# Patient Record
Sex: FEMALE | Race: WHITE | NOT HISPANIC OR LATINO | URBAN - METROPOLITAN AREA
[De-identification: names, ages, dates, MRNs, and addresses within clinical notes are randomized per-mention and may not be internally consistent; named-entity substitution may affect disease eponyms.]

---

## 2021-09-29 ENCOUNTER — OFFICE VISIT (OUTPATIENT)
Dept: MEDICAL GROUP | Facility: MEDICAL CENTER | Age: 26
End: 2021-09-29
Payer: COMMERCIAL

## 2021-09-29 ENCOUNTER — TELEPHONE (OUTPATIENT)
Dept: SCHEDULING | Facility: IMAGING CENTER | Age: 26
End: 2021-09-29

## 2021-09-29 VITALS
WEIGHT: 171.74 LBS | HEART RATE: 90 BPM | DIASTOLIC BLOOD PRESSURE: 68 MMHG | BODY MASS INDEX: 29.32 KG/M2 | TEMPERATURE: 98 F | OXYGEN SATURATION: 97 % | SYSTOLIC BLOOD PRESSURE: 122 MMHG | HEIGHT: 64 IN

## 2021-09-29 DIAGNOSIS — M79.675 PAIN OF TOE OF LEFT FOOT: ICD-10-CM

## 2021-09-29 DIAGNOSIS — G43.009 MIGRAINE WITHOUT AURA AND WITHOUT STATUS MIGRAINOSUS, NOT INTRACTABLE: ICD-10-CM

## 2021-09-29 DIAGNOSIS — Z13.220 ENCOUNTER FOR LIPID SCREENING FOR CARDIOVASCULAR DISEASE: ICD-10-CM

## 2021-09-29 DIAGNOSIS — Z13.21 ENCOUNTER FOR VITAMIN DEFICIENCY SCREENING: ICD-10-CM

## 2021-09-29 DIAGNOSIS — Z13.1 SCREENING FOR DIABETES MELLITUS: ICD-10-CM

## 2021-09-29 DIAGNOSIS — Z13.0 SCREENING FOR DEFICIENCY ANEMIA: ICD-10-CM

## 2021-09-29 DIAGNOSIS — Z13.6 ENCOUNTER FOR LIPID SCREENING FOR CARDIOVASCULAR DISEASE: ICD-10-CM

## 2021-09-29 DIAGNOSIS — H93.8X3 PRESSURE SENSATION IN BOTH EARS: ICD-10-CM

## 2021-09-29 PROBLEM — S53.441A SPRAIN OF ULNAR COLLATERAL LIGAMENT OF RIGHT ELBOW: Status: ACTIVE | Noted: 2021-03-01

## 2021-09-29 PROBLEM — S52.123D CLOSED DISPLACED FRACTURE OF HEAD OF RADIUS WITH ROUTINE HEALING: Status: RESOLVED | Noted: 2021-03-01 | Resolved: 2021-09-29

## 2021-09-29 PROBLEM — S52.123D CLOSED DISPLACED FRACTURE OF HEAD OF RADIUS WITH ROUTINE HEALING: Status: ACTIVE | Noted: 2021-03-01

## 2021-09-29 PROBLEM — S53.441A SPRAIN OF ULNAR COLLATERAL LIGAMENT OF RIGHT ELBOW: Status: RESOLVED | Noted: 2021-03-01 | Resolved: 2021-09-29

## 2021-09-29 PROCEDURE — 99214 OFFICE O/P EST MOD 30 MIN: CPT | Performed by: FAMILY MEDICINE

## 2021-09-29 ASSESSMENT — ENCOUNTER SYMPTOMS
DIARRHEA: 0
WEIGHT LOSS: 0
DOUBLE VISION: 0
CONSTIPATION: 0
SHORTNESS OF BREATH: 0
ABDOMINAL PAIN: 0
PALPITATIONS: 0
DIZZINESS: 0
NERVOUS/ANXIOUS: 0
BLURRED VISION: 0
DEPRESSION: 0
VOMITING: 0
CHILLS: 0
FEVER: 0
MYALGIAS: 0
TINGLING: 0
NAUSEA: 0
SORE THROAT: 0
BRUISES/BLEEDS EASILY: 0
COUGH: 0
WEAKNESS: 0

## 2021-09-29 ASSESSMENT — PATIENT HEALTH QUESTIONNAIRE - PHQ9: CLINICAL INTERPRETATION OF PHQ2 SCORE: 0

## 2021-09-29 NOTE — ASSESSMENT & PLAN NOTE
Patient reports that she has a history of migraines without aura.  She has been prescribed sumatriptan to help with the migraines.    She says she does not like to use this medication as it can cause neck stiffness.  She primarily treats her migraines with Tylenol Motrin.

## 2021-09-29 NOTE — ASSESSMENT & PLAN NOTE
Patient reports that ever since she was little she would always have difficulty popping or releasing the pressure to her bilateral ears.  She recently moved here from [place] at the beginning of the year, reports that since that time she has been having worsening of the pressure to her bilateral ears as well as the loss of hearing.  Patient reports that other people have noticed her difficulty hearing them at times.  Over the last few months she has also noticed itchiness to ears and drainage.  Reports a yellow crusty drainage, but has not noticed this drainage on her pillow she wakes up in the morning.  Her father, who is a primary care practitioner back at home recommended that she take an antihistamine.  She has tried Benadryl with no help but did note that hydrocortisone cream applied topically helped somewhat with itching.  Hearing sensation as well as difficulty popping greatly improves when she goes back [place] to [place]  Reports that she lives here for her job and her life is in a higher elevation and would like to investigate the source of her ear discomfort.

## 2021-09-29 NOTE — PROGRESS NOTES
Tahira Chi is a pleasant 26 y.o. female here to establish care.    HPI:   Pressure sensation in both ears  Patient reports that ever since she was little she would always have difficulty popping or releasing the pressure to her bilateral ears.  She recently moved here from [place] at the beginning of the year, reports that since that time she has been having worsening of the pressure to her bilateral ears as well as the loss of hearing.  Patient reports that other people have noticed her difficulty hearing them at times.  Over the last few months she has also noticed itchiness to ears and drainage.  Reports a yellow crusty drainage, but has not noticed this drainage on her pillow she wakes up in the morning.  Her father, who is a primary care practitioner back at home recommended that she take an antihistamine.  She has tried Benadryl with no help but did note that hydrocortisone cream applied topically helped somewhat with itching.  Hearing sensation as well as difficulty popping greatly improves when she goes back home to [place].  Reports that she lives here for her job and her life is in a higher elevation and would like to investigate the source of her ear discomfort.    Migraine without aura and without status migrainosus, not intractable  Patient reports that she has a history of migraines without aura.  She has been prescribed sumatriptan to help with the migraines.    She says she does not like to use this medication as it can cause neck stiffness.  She primarily treats her migraines with Tylenol Motrin.    Pain of toe of left foot  Patient reports that she you have walks in flip-flops most of the time as when she wear shoes, even steel toed boots she will start to experience pain and redness to her left great toe.  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      Health Maintenance  Immunization:  Patient is due for her influenza vaccine.  It does show that patient is due for her varicella vaccine, reports that she is updated on all her vaccinations she received them in her father's clinic.  Pap smear: Patient is due for her Pap smear.    Current medicines (including changes today)  Current Outpatient Medications   Medication Sig Dispense Refill   • SUMATRIPTAN SUCCINATE PO Take  by mouth.       No current facility-administered medications for this visit.       Past Medical/ Surgical History  She  has a past medical history of Closed displaced fracture of head of radius with routine healing (3/1/2021), Migraine, and Sprain of ulnar collateral ligament of right elbow (3/1/2021).  She  has a past surgical history that includes other orthopedic surgery.    Social History  Social History     Tobacco Use   • Smoking status: Never Smoker   • Smokeless tobacco: Never Used   Substance Use Topics   • Alcohol use: Yes     Comment: a couple drinks on the weekends   • Drug use: Yes     Types: Marijuana, Inhaled     Comment: occasional x 2 month     Social History     Social History Narrative   • Not on file        Family History  Family History   Problem Relation Age of Onset   • No Known Problems Mother    • Hyperlipidemia Father    • No Known Problems Brother    • Diabetes Maternal Grandfather         type 1   • No Known Problems Paternal Grandmother    • Heart Disease Paternal Grandfather      Family Status   Relation Name Status   • Mo  Alive   • Fa  Alive   • Bro Celso Alive   • MGFa  (Not Specified)   • PGMo  (Not Specified)   • PGFa  (Not Specified)         Review of Systems   Constitutional: Negative for chills, fever, malaise/fatigue and weight loss.   HENT: Positive for ear discharge, ear pain and hearing loss. Negative for sore throat.    Eyes: Negative for blurred vision and double vision.   Respiratory: Negative for cough and shortness of breath.    Cardiovascular: Negative for chest pain, palpitations and  "leg swelling.   Gastrointestinal: Negative for abdominal pain, constipation, diarrhea, nausea and vomiting.   Musculoskeletal: Negative for myalgias.        Left great toe pain while wearing shoes   Skin: Negative for rash.   Neurological: Negative for dizziness, tingling and weakness.   Endo/Heme/Allergies: Does not bruise/bleed easily.   Psychiatric/Behavioral: Negative for depression. The patient is not nervous/anxious.          Objective:     /68 (BP Location: Left arm, Patient Position: Sitting, BP Cuff Size: Adult)   Pulse 90   Temp 36.7 °C (98 °F) (Temporal)   Ht 1.62 m (5' 3.78\")   Wt 77.9 kg (171 lb 11.8 oz)   SpO2 97%  Body mass index is 29.68 kg/m².    Physical Exam  Constitutional:       General: She is not in acute distress.  HENT:      Head: Normocephalic and atraumatic.      Right Ear: Tympanic membrane, ear canal and external ear normal.      Left Ear: Tympanic membrane, ear canal and external ear normal.   Eyes:      Conjunctiva/sclera: Conjunctivae normal.      Pupils: Pupils are equal, round, and reactive to light.   Cardiovascular:      Rate and Rhythm: Normal rate and regular rhythm.      Heart sounds: No murmur heard.   No friction rub. No gallop.    Pulmonary:      Effort: Pulmonary effort is normal.      Breath sounds: Normal breath sounds. No wheezing or rales.   Abdominal:      General: Bowel sounds are normal.      Palpations: Abdomen is soft.      Tenderness: There is no abdominal tenderness.   Musculoskeletal:      Cervical back: Normal range of motion and neck supple.      Right lower leg: No edema.      Left lower leg: No edema.   Skin:     General: Skin is warm and dry.      Findings: No rash.   Neurological:      Mental Status: She is alert and oriented to person, place, and time.      Gait: Gait is intact.   Psychiatric:         Mood and Affect: Affect normal.        Imaging:  No recent imaging to review    Labs:   No recent labs to review  Assessment and Plan:   The " following treatment plan was discussed    1. Migraine without aura and without status migrainosus, not intractable  Chronic, stable.  I recommended that the patient continue with over-the-counter measures as the ears helping her with her migraines.  In our next visit together we will discuss with the patient about possibly starting on a prophylactic treatment which may help prevent migraines altogether.    2. Pressure sensation in both ears  Chronic, unstable.  I recommended that the patient follow-up with ear nose and throat as they were able to take a little bit more of a look as to the source of her hearing loss and inability to depressurized her ears.  I did recommend an over-the-counter antihistamine such as Zyrtec, Allegra, or Claritin to help with itchiness.  In her physical exam I am unable to determine the source of her ear drainage, I suspect that this is most likely cerumen.  - REFERRAL TO ENT    3. Pain of toe of left foot  Chronic, unstable.  I recommended that the patient wear shoes that are wide in the foot but as patient wears ski boots for her work I recommended that she follow-up with podiatry to investigate her toe pain further.  - REFERRAL TO PODIATRY    4. Encounter for lipid screening for cardiovascular disease  - Lipid Profile; Future    5. Encounter for vitamin deficiency screening  - VITAMIN D,25 HYDROXY; Future    6. Screening for deficiency anemia  - CBC WITH DIFFERENTIAL; Future    7. Screening for diabetes mellitus  - Comp Metabolic Panel; Future  - ESTIMATED GFR; Future      Records requested.  Followup: Return in about 4 weeks (around 10/27/2021), or if symptoms worsen or fail to improve, for well women.      Please note that this dictation was created using voice recognition software. I have made every reasonable attempt to correct obvious errors, but I expect that there are errors of grammar and possibly content that I did not discover before finalizing the note.

## 2021-09-29 NOTE — ASSESSMENT & PLAN NOTE
Patient reports that she you have walks in flip-flops most of the time as when she wear shoes, even steel toed boots she will start to experience pain and redness to her left great toe.  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

## 2022-04-02 ENCOUNTER — APPOINTMENT (OUTPATIENT)
Dept: RADIOLOGY | Facility: MEDICAL CENTER | Age: 27
DRG: 872 | End: 2022-04-02
Attending: EMERGENCY MEDICINE
Payer: COMMERCIAL

## 2022-04-02 ENCOUNTER — APPOINTMENT (OUTPATIENT)
Dept: RADIOLOGY | Facility: MEDICAL CENTER | Age: 27
DRG: 872 | End: 2022-04-02
Payer: COMMERCIAL

## 2022-04-02 ENCOUNTER — HOSPITAL ENCOUNTER (OUTPATIENT)
Dept: RADIOLOGY | Facility: MEDICAL CENTER | Age: 27
End: 2022-04-02

## 2022-04-02 ENCOUNTER — HOSPITAL ENCOUNTER (INPATIENT)
Facility: MEDICAL CENTER | Age: 27
LOS: 5 days | DRG: 872 | End: 2022-04-07
Attending: EMERGENCY MEDICINE | Admitting: INTERNAL MEDICINE
Payer: COMMERCIAL

## 2022-04-02 PROBLEM — J02.9 LEMIERRE SYNDROME: Status: ACTIVE | Noted: 2022-04-02

## 2022-04-02 PROBLEM — I80.8 LEMIERRE SYNDROME: Status: ACTIVE | Noted: 2022-04-02

## 2022-04-02 PROBLEM — A41.9 SEPSIS (HCC): Status: ACTIVE | Noted: 2022-04-02

## 2022-04-02 LAB
ALBUMIN SERPL BCP-MCNC: 3.7 G/DL (ref 3.2–4.9)
ALBUMIN/GLOB SERPL: 1.4 G/DL
ALP SERPL-CCNC: 56 U/L (ref 30–99)
ALT SERPL-CCNC: 7 U/L (ref 2–50)
ANION GAP SERPL CALC-SCNC: 12 MMOL/L (ref 7–16)
APTT PPP: 31.1 SEC (ref 24.7–36)
AST SERPL-CCNC: 11 U/L (ref 12–45)
BASOPHILS # BLD AUTO: 0.4 % (ref 0–1.8)
BASOPHILS # BLD: 0.06 K/UL (ref 0–0.12)
BILIRUB SERPL-MCNC: 0.8 MG/DL (ref 0.1–1.5)
BUN SERPL-MCNC: 8 MG/DL (ref 8–22)
CALCIUM SERPL-MCNC: 8.1 MG/DL (ref 8.5–10.5)
CHLORIDE SERPL-SCNC: 102 MMOL/L (ref 96–112)
CO2 SERPL-SCNC: 20 MMOL/L (ref 20–33)
CREAT SERPL-MCNC: 0.79 MG/DL (ref 0.5–1.4)
EOSINOPHIL # BLD AUTO: 0.01 K/UL (ref 0–0.51)
EOSINOPHIL NFR BLD: 0.1 % (ref 0–6.9)
ERYTHROCYTE [DISTWIDTH] IN BLOOD BY AUTOMATED COUNT: 45.2 FL (ref 35.9–50)
FLUAV RNA SPEC QL NAA+PROBE: NEGATIVE
FLUBV RNA SPEC QL NAA+PROBE: NEGATIVE
GFR SERPLBLD CREATININE-BSD FMLA CKD-EPI: 106 ML/MIN/1.73 M 2
GLOBULIN SER CALC-MCNC: 2.7 G/DL (ref 1.9–3.5)
GLUCOSE SERPL-MCNC: 104 MG/DL (ref 65–99)
GRAM STN SPEC: NORMAL
HCT VFR BLD AUTO: 39.1 % (ref 37–47)
HETEROPH AB SER QL: NEGATIVE
HGB BLD-MCNC: 13 G/DL (ref 12–16)
IMM GRANULOCYTES # BLD AUTO: 0.07 K/UL (ref 0–0.11)
IMM GRANULOCYTES NFR BLD AUTO: 0.5 % (ref 0–0.9)
INR PPP: 1.24 (ref 0.87–1.13)
LACTATE BLD-SCNC: 0.9 MMOL/L (ref 0.5–2)
LACTATE BLD-SCNC: 1 MMOL/L (ref 0.5–2)
LYMPHOCYTES # BLD AUTO: 0.94 K/UL (ref 1–4.8)
LYMPHOCYTES NFR BLD: 6.7 % (ref 22–41)
MCH RBC QN AUTO: 30.2 PG (ref 27–33)
MCHC RBC AUTO-ENTMCNC: 33.2 G/DL (ref 33.6–35)
MCV RBC AUTO: 90.9 FL (ref 81.4–97.8)
MONOCYTES # BLD AUTO: 1.26 K/UL (ref 0–0.85)
MONOCYTES NFR BLD AUTO: 9 % (ref 0–13.4)
NEUTROPHILS # BLD AUTO: 11.68 K/UL (ref 2–7.15)
NEUTROPHILS NFR BLD: 83.3 % (ref 44–72)
NRBC # BLD AUTO: 0 K/UL
NRBC BLD-RTO: 0 /100 WBC
PLATELET # BLD AUTO: 171 K/UL (ref 164–446)
PMV BLD AUTO: 11 FL (ref 9–12.9)
POTASSIUM SERPL-SCNC: 3.7 MMOL/L (ref 3.6–5.5)
PROT SERPL-MCNC: 6.4 G/DL (ref 6–8.2)
PROTHROMBIN TIME: 15.2 SEC (ref 12–14.6)
RBC # BLD AUTO: 4.3 M/UL (ref 4.2–5.4)
RSV RNA SPEC QL NAA+PROBE: NEGATIVE
SARS-COV-2 RNA RESP QL NAA+PROBE: NOTDETECTED
SIGNIFICANT IND 70042: NORMAL
SITE SITE: NORMAL
SODIUM SERPL-SCNC: 134 MMOL/L (ref 135–145)
SOURCE SOURCE: NORMAL
SPECIMEN SOURCE: NORMAL
WBC # BLD AUTO: 14 K/UL (ref 4.8–10.8)

## 2022-04-02 PROCEDURE — 700105 HCHG RX REV CODE 258

## 2022-04-02 PROCEDURE — 87186 SC STD MICRODIL/AGAR DIL: CPT

## 2022-04-02 PROCEDURE — 70548 MR ANGIOGRAPHY NECK W/DYE: CPT

## 2022-04-02 PROCEDURE — 770020 HCHG ROOM/CARE - TELE (206)

## 2022-04-02 PROCEDURE — 700117 HCHG RX CONTRAST REV CODE 255: Performed by: EMERGENCY MEDICINE

## 2022-04-02 PROCEDURE — 87070 CULTURE OTHR SPECIMN AEROBIC: CPT

## 2022-04-02 PROCEDURE — 87040 BLOOD CULTURE FOR BACTERIA: CPT | Mod: 91

## 2022-04-02 PROCEDURE — 87147 CULTURE TYPE IMMUNOLOGIC: CPT

## 2022-04-02 PROCEDURE — 71046 X-RAY EXAM CHEST 2 VIEWS: CPT

## 2022-04-02 PROCEDURE — 87077 CULTURE AEROBIC IDENTIFY: CPT

## 2022-04-02 PROCEDURE — 85610 PROTHROMBIN TIME: CPT

## 2022-04-02 PROCEDURE — 96365 THER/PROPH/DIAG IV INF INIT: CPT

## 2022-04-02 PROCEDURE — 96375 TX/PRO/DX INJ NEW DRUG ADDON: CPT

## 2022-04-02 PROCEDURE — 700111 HCHG RX REV CODE 636 W/ 250 OVERRIDE (IP)

## 2022-04-02 PROCEDURE — 99223 1ST HOSP IP/OBS HIGH 75: CPT | Mod: GC | Performed by: INTERNAL MEDICINE

## 2022-04-02 PROCEDURE — 80053 COMPREHEN METABOLIC PANEL: CPT

## 2022-04-02 PROCEDURE — A9270 NON-COVERED ITEM OR SERVICE: HCPCS | Performed by: EMERGENCY MEDICINE

## 2022-04-02 PROCEDURE — 85025 COMPLETE CBC W/AUTO DIFF WBC: CPT

## 2022-04-02 PROCEDURE — 700105 HCHG RX REV CODE 258: Performed by: EMERGENCY MEDICINE

## 2022-04-02 PROCEDURE — 700102 HCHG RX REV CODE 250 W/ 637 OVERRIDE(OP): Performed by: EMERGENCY MEDICINE

## 2022-04-02 PROCEDURE — C9803 HOPD COVID-19 SPEC COLLECT: HCPCS | Performed by: EMERGENCY MEDICINE

## 2022-04-02 PROCEDURE — 0241U HCHG SARS-COV-2 COVID-19 NFCT DS RESP RNA 4 TRGT MIC: CPT

## 2022-04-02 PROCEDURE — 36415 COLL VENOUS BLD VENIPUNCTURE: CPT

## 2022-04-02 PROCEDURE — 87205 SMEAR GRAM STAIN: CPT

## 2022-04-02 PROCEDURE — 99285 EMERGENCY DEPT VISIT HI MDM: CPT

## 2022-04-02 PROCEDURE — 83605 ASSAY OF LACTIC ACID: CPT | Mod: 91

## 2022-04-02 PROCEDURE — 84703 CHORIONIC GONADOTROPIN ASSAY: CPT

## 2022-04-02 PROCEDURE — 700111 HCHG RX REV CODE 636 W/ 250 OVERRIDE (IP): Performed by: EMERGENCY MEDICINE

## 2022-04-02 PROCEDURE — A9576 INJ PROHANCE MULTIPACK: HCPCS | Performed by: EMERGENCY MEDICINE

## 2022-04-02 PROCEDURE — 85730 THROMBOPLASTIN TIME PARTIAL: CPT

## 2022-04-02 PROCEDURE — 86308 HETEROPHILE ANTIBODY SCREEN: CPT

## 2022-04-02 PROCEDURE — 93971 EXTREMITY STUDY: CPT | Mod: LT

## 2022-04-02 RX ORDER — OXYCODONE HYDROCHLORIDE 5 MG/1
5 TABLET ORAL
Status: DISCONTINUED | OUTPATIENT
Start: 2022-04-02 | End: 2022-04-07 | Stop reason: HOSPADM

## 2022-04-02 RX ORDER — SODIUM CHLORIDE, SODIUM LACTATE, POTASSIUM CHLORIDE, CALCIUM CHLORIDE 600; 310; 30; 20 MG/100ML; MG/100ML; MG/100ML; MG/100ML
1000 INJECTION, SOLUTION INTRAVENOUS ONCE
Status: COMPLETED | OUTPATIENT
Start: 2022-04-02 | End: 2022-04-02

## 2022-04-02 RX ORDER — ACETAMINOPHEN 325 MG/1
650 TABLET ORAL EVERY 6 HOURS PRN
Status: DISCONTINUED | OUTPATIENT
Start: 2022-04-02 | End: 2022-04-07 | Stop reason: HOSPADM

## 2022-04-02 RX ORDER — POLYETHYLENE GLYCOL 3350 17 G/17G
1 POWDER, FOR SOLUTION ORAL
Status: DISCONTINUED | OUTPATIENT
Start: 2022-04-02 | End: 2022-04-07 | Stop reason: HOSPADM

## 2022-04-02 RX ORDER — ACETAMINOPHEN 325 MG/1
975 TABLET ORAL ONCE
Status: COMPLETED | OUTPATIENT
Start: 2022-04-02 | End: 2022-04-02

## 2022-04-02 RX ORDER — BISACODYL 10 MG
10 SUPPOSITORY, RECTAL RECTAL
Status: DISCONTINUED | OUTPATIENT
Start: 2022-04-02 | End: 2022-04-07 | Stop reason: HOSPADM

## 2022-04-02 RX ORDER — OXYCODONE HYDROCHLORIDE 5 MG/1
2.5 TABLET ORAL
Status: DISCONTINUED | OUTPATIENT
Start: 2022-04-02 | End: 2022-04-07 | Stop reason: HOSPADM

## 2022-04-02 RX ORDER — AMOXICILLIN 250 MG
2 CAPSULE ORAL 2 TIMES DAILY
Status: DISCONTINUED | OUTPATIENT
Start: 2022-04-02 | End: 2022-04-07 | Stop reason: HOSPADM

## 2022-04-02 RX ORDER — MORPHINE SULFATE 4 MG/ML
2 INJECTION INTRAVENOUS
Status: DISCONTINUED | OUTPATIENT
Start: 2022-04-02 | End: 2022-04-07 | Stop reason: HOSPADM

## 2022-04-02 RX ADMIN — PIPERACILLIN AND TAZOBACTAM 4.5 G: 4; .5 INJECTION, POWDER, LYOPHILIZED, FOR SOLUTION INTRAVENOUS; PARENTERAL at 13:23

## 2022-04-02 RX ADMIN — GADOTERIDOL 20 ML: 279.3 INJECTION, SOLUTION INTRAVENOUS at 14:27

## 2022-04-02 RX ADMIN — MORPHINE SULFATE 2 MG: 4 INJECTION INTRAVENOUS at 23:01

## 2022-04-02 RX ADMIN — ACETAMINOPHEN 975 MG: 325 TABLET, FILM COATED ORAL at 12:56

## 2022-04-02 RX ADMIN — SODIUM CHLORIDE, POTASSIUM CHLORIDE, SODIUM LACTATE AND CALCIUM CHLORIDE 1000 ML: 600; 310; 30; 20 INJECTION, SOLUTION INTRAVENOUS at 12:56

## 2022-04-02 RX ADMIN — MORPHINE SULFATE 2 MG: 4 INJECTION INTRAVENOUS at 16:22

## 2022-04-02 RX ADMIN — PIPERACILLIN SODIUM AND TAZOBACTAM SODIUM 3.38 G: 3; .375 INJECTION, POWDER, FOR SOLUTION INTRAVENOUS at 18:53

## 2022-04-02 ASSESSMENT — LIFESTYLE VARIABLES
TOTAL SCORE: 0
TOTAL SCORE: 0
HOW MANY TIMES IN THE PAST YEAR HAVE YOU HAD 5 OR MORE DRINKS IN A DAY: 0
ON A TYPICAL DAY WHEN YOU DRINK ALCOHOL HOW MANY DRINKS DO YOU HAVE: 0
EVER FELT BAD OR GUILTY ABOUT YOUR DRINKING: NO
SUBSTANCE_ABUSE: 0
AVERAGE NUMBER OF DAYS PER WEEK YOU HAVE A DRINK CONTAINING ALCOHOL: 0
TOTAL SCORE: 0
HAVE PEOPLE ANNOYED YOU BY CRITICIZING YOUR DRINKING: NO
EVER HAD A DRINK FIRST THING IN THE MORNING TO STEADY YOUR NERVES TO GET RID OF A HANGOVER: NO
ALCOHOL_USE: NO
HAVE YOU EVER FELT YOU SHOULD CUT DOWN ON YOUR DRINKING: NO
CONSUMPTION TOTAL: NEGATIVE

## 2022-04-02 ASSESSMENT — ENCOUNTER SYMPTOMS
DIZZINESS: 0
DEPRESSION: 0
SINUS PAIN: 0
WEAKNESS: 0
COUGH: 0
FALLS: 0
HALLUCINATIONS: 0
SORE THROAT: 1
FOCAL WEAKNESS: 0
VOMITING: 0
NAUSEA: 0
SHORTNESS OF BREATH: 0
ABDOMINAL PAIN: 0
WHEEZING: 0
PALPITATIONS: 0
EYE DISCHARGE: 0
NECK PAIN: 1
MYALGIAS: 1
BLURRED VISION: 0
CHILLS: 1
HEADACHES: 1
FEVER: 1
FLANK PAIN: 0
DOUBLE VISION: 0
DIARRHEA: 0
CONSTIPATION: 0
EYE PAIN: 0

## 2022-04-02 ASSESSMENT — COGNITIVE AND FUNCTIONAL STATUS - GENERAL
DRESSING REGULAR LOWER BODY CLOTHING: A LITTLE
SUGGESTED CMS G CODE MODIFIER DAILY ACTIVITY: CJ
HELP NEEDED FOR BATHING: A LITTLE
CLIMB 3 TO 5 STEPS WITH RAILING: A LITTLE
SUGGESTED CMS G CODE MODIFIER MOBILITY: CJ
MOVING TO AND FROM BED TO CHAIR: A LITTLE
DAILY ACTIVITIY SCORE: 22
MOVING FROM LYING ON BACK TO SITTING ON SIDE OF FLAT BED: A LITTLE
MOBILITY SCORE: 21

## 2022-04-02 ASSESSMENT — PATIENT HEALTH QUESTIONNAIRE - PHQ9
2. FEELING DOWN, DEPRESSED, IRRITABLE, OR HOPELESS: NOT AT ALL
1. LITTLE INTEREST OR PLEASURE IN DOING THINGS: NOT AT ALL
SUM OF ALL RESPONSES TO PHQ9 QUESTIONS 1 AND 2: 0

## 2022-04-02 NOTE — ED PROVIDER NOTES
ED Provider Note    CHIEF COMPLAINT  Chief Complaint   Patient presents with   • Neck Pain   • Ear Pain       HPI  Tahira Chi is a 26 y.o. female who presents for evaluation of high fever sore throat left ear pain.  The patient was seen at [facility] earlier today.  She was subsequently diagnosed with otitis externa, had leukocytosis and also suggestion of possible Lemierre's syndrome with possible thrombus in the left internal jugular vessel.  She specifically denies headache.  She does report severe left ear pain.  She had extensive evaluation at the transferring facility including blood cultures, extensive blood test and CT scan of the head and neck with contrast.  She was given IV Rocephin and transferred here for higher level of care.  She denies history of IV drug use.  No strokelike symptoms such as numbness weakness or tingling the arms legs or face    REVIEW OF SYSTEMS  See HPI for further details.  Positive for high fever neck pain left ear pain all other systems are negative.     PAST MEDICAL HISTORY  Past Medical History:   Diagnosis Date   • Closed displaced fracture of head of radius with routine healing 3/1/2021   • Sprain of ulnar collateral ligament of right elbow 3/1/2021   • Migraine        FAMILY HISTORY  Noncontributory    SOCIAL HISTORY  Social History     Socioeconomic History   • Marital status: Single   Tobacco Use   • Smoking status: Never Smoker   • Smokeless tobacco: Never Used   Substance and Sexual Activity   • Alcohol use: Yes     Comment: occasionally   • Drug use: Yes     Types: Marijuana, Inhaled     Comment: 3x weekly   • Sexual activity: Yes     Partners: Male     Birth control/protection: None     Comment: Single       SURGICAL HISTORY  Past Surgical History:   Procedure Laterality Date   • OTHER ORTHOPEDIC SURGERY      Right arm       CURRENT MEDICATIONS  Home Medications    **Home medications have not yet been reviewed for this encounter**     Patient  "received IV Rocephin prior to arrival    ALLERGIES  No Known Allergies    PHYSICAL EXAM  VITAL SIGNS: /100   Pulse (!) 121   Temp (!) 39.6 °C (103.3 °F) (Oral)   Resp 18   Ht 1.6 m (5' 3\")   Wt 70.3 kg (155 lb)   LMP 03/22/2022 (Approximate)   SpO2 94%   BMI 27.46 kg/m²       Constitutional: Well developed, Well nourished, No acute distress, Non-toxic appearance.   HENT: Normocephalic, Atraumatic, left ear exam is notable for tenderness.  Tympanic membrane is erythematous bulging without perforation.  Posterior pharynx is erythematous without mass-effect l, Oropharynx moist, No oral exudates, Nose normal.   Eyes: PERRLA, EOMI, Conjunctiva normal, No discharge.   Neck: Normal range of motion, No tenderness, Supple, No stridor.   Lymphatic: Bilateral anterior cervical lymphadenopathy noted.   Cardiovascular: Tachycardic, Normal rhythm, No murmurs, No rubs, No gallops.   Thorax & Lungs: Normal breath sounds, No respiratory distress, No wheezing, No chest tenderness.   Abdomen: Bowel sounds normal, Soft, No tenderness, No masses, No pulsatile masses.   Skin: Warm, Dry, No erythema, No rash.   Back: No tenderness, No CVA tenderness.   Extremities: Intact distal pulses, No edema, No tenderness, No cyanosis, No clubbing.   Neurologic: Alert & oriented x 3, Normal motor function, Normal sensory function, No focal deficits noted.   Psychiatric: Affect normal, Judgment normal, Mood normal.       RADIOLOGY/PROCEDURES  DIAGNOSTIC IMAGING RESULTS    EXAM: CT SOFT TISSUE NECK W CONTRAST    DATE OF EXAM: 4/2/2022 8:55    ORDERING PHYSICIAN: NADEEM GRAJEDA    COMPARISON: None    REASON FOR EXAM: Concern for mastoiditis    ADDITIONAL HISTORY:   14.    TECHNIQUE: Volumetric CT acquisition of the neck was performed from skull base to the thoracic inlet following contrast administration. Multiplanar reformatted images submitted for review.    RADIATION DOSE: CTDIvol 0.1,0.2,10.2 mGy .4 mGy-cm    CONTRAST: " IOPAMIDOL 61 % INTRAVENOUS SOLUTION (SINGLE USE VIAL) Given:100 mL administered IV    FINDINGS:     SKULL BASE: The visualized architecture of the posterior fossa is unremarkable.    PARANASAL SINUSES: There is no significant mucosal thickening, sinus opacification or air-fluid levels.    SALIVARY GLANDS: There is mild asymmetric infiltrative inflammatory change in the left parotid with multiple mildly prominent lymph nodes within the substance of the left parotid possibly sympathetic to the adjacent otitis externa without convincing stones, abscess or phlegmon of the parotid gland.    SOFT TISSUES: There is asymmetric subcutaneous thickening and infiltrative change surrounding the left external auditory canal without luminal debris or granulation concerning for otitis externa. There is no drainable collection.    LYMPH NODES: Mild to moderately bulky extensive left cervical and accessory cervical chain lymph node prominence compatible with adenitis without evidence of necrotizing or suppurative lymph node necrosis.    VASCULATURE: The carotid and vertebral arteries are patent to the skull base. There is asymmetric segmental absence of luminal contrast in the proximal third left internal jugular vein raising the concern of septic thrombophlebitis without drainable collection.    BONES: 4    THYROID: The thyroid is relatively symmetric without significant mass.    PULMONARY APICES: Normally aerated without significant abnormality identified.     IMPRESSION:     Mild asymmetric regional inflammatory change suggested involving the left external auditory canal and adjacent parotid which in correlation to the clinical presentation is most consistent with otitis externa versus less likely parotiditis    Moderately prominent and extensive left cervical and accessory cervical chain adenitis without liquefactive destruction or suppurative adenitis    Segmental enhancement defect proximal third left internal jugular vein  concerning for thrombophlebitis    The above findings discussed with the ED physician at the time of dictation.       Mild asymmetric regional inflammatory change suggested involving the left external auditory canal and   adjacent parotid which in correlation to the clinical presentation is most consistent with otitis externa   versus less likely parotiditis    Moderately prominent and extensive left cervical and accessory cervical chain adenitis without   liquefactive destruction or suppurative adenitis    Segmental enhancement defect proximal third left internal jugular vein concerning for thrombophlebitis    The above findings discussed with the ED physician at the time of dictation.     Thank you for referring OTILIA ASHBY, : 1995, MRN: [id number], Accession #: [id number]     [facility]   Electronically signed by Gary Henson MD on 2022 9:50         CT SOFT TISSUE NECK W CONTRAST (2022 8:54 AM PDT)   Narrative   INTERFACE SYSTEM - 2022 9:53 AM PDT    DIAGNOSTIC IMAGING RESULTS    EXAM: CT SOFT TISSUE NECK W CONTRAST    DATE OF EXAM: 2022 8:55    ORDERING PHYSICIAN: NADEEM GRAJEDA    COMPARISON: None    REASON FOR EXAM: Concern for mastoiditis     Results for orders placed or performed during the hospital encounter of 22   CBC With Differential   Result Value Ref Range    WBC 14.0 (H) 4.8 - 10.8 K/uL    RBC 4.30 4.20 - 5.40 M/uL    Hemoglobin 13.0 12.0 - 16.0 g/dL    Hematocrit 39.1 37.0 - 47.0 %    MCV 90.9 81.4 - 97.8 fL    MCH 30.2 27.0 - 33.0 pg    MCHC 33.2 (L) 33.6 - 35.0 g/dL    RDW 45.2 35.9 - 50.0 fL    Platelet Count 171 164 - 446 K/uL    MPV 11.0 9.0 - 12.9 fL    Neutrophils-Polys 83.30 (H) 44.00 - 72.00 %    Lymphocytes 6.70 (L) 22.00 - 41.00 %    Monocytes 9.00 0.00 - 13.40 %    Eosinophils 0.10 0.00 - 6.90 %    Basophils 0.40 0.00 - 1.80 %    Immature Granulocytes 0.50 0.00 - 0.90 %    Nucleated RBC 0.00 /100 WBC    Neutrophils  (Absolute) 11.68 (H) 2.00 - 7.15 K/uL    Lymphs (Absolute) 0.94 (L) 1.00 - 4.80 K/uL    Monos (Absolute) 1.26 (H) 0.00 - 0.85 K/uL    Eos (Absolute) 0.01 0.00 - 0.51 K/uL    Baso (Absolute) 0.06 0.00 - 0.12 K/uL    Immature Granulocytes (abs) 0.07 0.00 - 0.11 K/uL    NRBC (Absolute) 0.00 K/uL   Lactic Acid   Result Value Ref Range    Lactic Acid 1.0 0.5 - 2.0 mmol/L         COURSE & MEDICAL DECISION MAKING  Pertinent Labs & Imaging studies reviewed. (See chart for details)  Septic protocol was initiated.  Patient already had some basic work-up at the outside facility.  Consultation with Dr. Zarate ENT was obtained.  He is recommended culturing the left ear canal and MRV of the neck to better clarify whether or not there is a septic thrombus.  Based on up-to-date recommendations they do not necessarily recommend anticoagulating this and we will get better imaging studies to help clarify whether or not a thrombus is in place.  Septic protocol has been initiated and the patient was given IV Zosyn.  The patient will be admitted to hospitalist service    FINAL IMPRESSION  1.  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

## 2022-04-02 NOTE — H&P
History & Physical Note    Date of Admission: 4/2/2022  Admission Status: Inpatient  [facility] Team: [facility]  Attending: JUSTIN Tomas   Senior Resident: Dr. Osei  Intern: Dr. Mendoza  Contact Number: [phone]    Chief Complaint: L neck, and ear pain    History of Present Illness (HPI):  Tahira is a 26 y.o. female a PMH of migraine otherwise healthy presenting with a complaint of acute onset severe left neck and ear pain.     She states that about 10 days ago she had a sore throat that resolved after 4 days. At that time her only symptom was sore throat. Then she reports developing L ear pain late last night (4/1) along with high grade fever up to 103 as well as severe L neck pain and swelling. She states that she is unable to really turn or move her head in any capacity due to the pain.     Notably she is a swimmer and recently was swimming in [facility]. Additionally, she recently and unprotected oral and vaginal intercourse on 3/29. She denies a history of STI and states she was screened about 6 months ago. She denies any axillary lymph node involvement. She denies any genitourinary symptoms. Denies any acute neurologic symptoms apart from a headache. Denies vision changes, or new oral lesions. She does report a bottom tooth extraction in 11/2021, however has not had any issues since.     Initially patient presented to [facility] where workup of her symptoms revealed WBC 16, Na 129, Procal neg, LA neg, otherwise unremarkable. CT imaging of head and neck showed inflammatory changes in the L neck soft tissues along with extensive cervical lymphadenopathy as well as concern for a septic thrombophlebitis of the L IJV. She received a dose of C3, dialudid, 1 L NS and zofran.    Patient was transferred to [facility] for higher level of care.    In the the ED she was found to be febrile with temp of 103, , RR 18, /100, O2 sat 94% on RA. Labs notable for WBC 14. ENT was  consulted and recommend MRV and zosyn.     Review of Systems:   Review of Systems   Constitutional: Positive for chills, fever and malaise/fatigue.   HENT: Positive for ear discharge and sore throat. Negative for congestion, ear pain, nosebleeds and sinus pain.    Eyes: Negative for blurred vision, double vision, pain and discharge.   Respiratory: Negative for cough, shortness of breath and wheezing.    Cardiovascular: Negative for chest pain, palpitations and leg swelling.   Gastrointestinal: Negative for abdominal pain, constipation, diarrhea, nausea and vomiting.   Genitourinary: Negative for dysuria, flank pain, frequency, hematuria and urgency.   Musculoskeletal: Positive for myalgias and neck pain. Negative for falls.   Skin: Negative for rash.   Neurological: Positive for headaches. Negative for dizziness, focal weakness and weakness.   Psychiatric/Behavioral: Negative for depression, hallucinations and substance abuse.       When reviewing histories, add date and indication in the history section whenever possible.  Past Medical History:   Past Medical History was reviewed with patient.   has a past medical history of Closed displaced fracture of head of radius with routine healing (3/1/2021), Migraine, and Sprain of ulnar collateral ligament of right elbow (3/1/2021).    Past Surgical History: Past Surgical History was reviewed with patient.   has a past surgical history that includes other orthopedic surgery.    Medications: Medications have been reviewed with patient.  None        Allergies: Allergies have been reviewed with patient.  No Known Allergies    Family History:   family history includes Diabetes in her maternal grandfather; Heart Disease in her paternal grandfather; Hyperlipidemia in her father; No Known Problems in her brother, mother, and paternal grandmother.     Social History:   Tobacco: Denies  Alcohol: 3 beers, 4x/week  Recreational drugs (illegal and prescription):  denies  Living  situation:  Lives in [facility] [place]  Primary Care Provider: ARIS Herrera  Physical Exam:  Vitals:  Temp:  [38 °C (100.4 °F)-39.6 °C (103.3 °F)] 38 °C (100.4 °F)  Pulse:  [114-121] 114  Resp:  [18] 18  BP: (133-143)/() 133/81  SpO2:  [92 %-94 %] 92 %    Physical Exam  Constitutional:       General: She is not in acute distress.     Appearance: She is ill-appearing.   HENT:      Head: Normocephalic and atraumatic.      Ears:      Comments: Left external ear with yellow crusting. No visible active draining. Tragus non-tender. Normal EAC and tympanic membrane poorly visualized though no appreciable fluid fluid. Severe posterior auricular tenderness.      Nose: Nose normal.      Mouth/Throat:      Mouth: Mucous membranes are moist.      Pharynx: Oropharynx is clear. No oropharyngeal exudate or posterior oropharyngeal erythema.   Eyes:      General: No scleral icterus.        Right eye: No discharge.         Left eye: No discharge.      Extraocular Movements: Extraocular movements intact.      Conjunctiva/sclera: Conjunctivae normal.      Pupils: Pupils are equal, round, and reactive to light.   Neck:      Comments: Visibly swollen L neck with swollen and tender extensive lymphadenopathy including the postauricular and posterior cervical lymphnodes.  ROM limited due to pain and swelling  Cardiovascular:      Rate and Rhythm: Tachycardia present.      Pulses: Normal pulses.      Heart sounds: Normal heart sounds. No murmur heard.  Pulmonary:      Effort: Pulmonary effort is normal. No respiratory distress.      Breath sounds: Normal breath sounds.   Chest:      Chest wall: No tenderness.   Abdominal:      General: Abdomen is flat. Bowel sounds are normal. There is no distension.      Palpations: Abdomen is soft.      Tenderness: There is no abdominal tenderness. There is no guarding.      Comments: No hepatosplenomegally   Musculoskeletal:         General: No swelling, tenderness,  deformity or signs of injury. Normal range of motion.      Right lower leg: No edema.      Left lower leg: No edema.   Skin:     General: Skin is warm and dry.      Findings: No lesion or rash.   Neurological:      General: No focal deficit present.      Mental Status: She is alert and oriented to person, place, and time.      Cranial Nerves: No cranial nerve deficit.      Sensory: No sensory deficit.      Motor: No weakness.   Psychiatric:         Mood and Affect: Mood normal.         Behavior: Behavior normal.         Thought Content: Thought content normal.         Judgment: Judgment normal.         Labs:   Recent Labs     04/02/22  1245   WBC 14.0*   RBC 4.30   HEMOGLOBIN 13.0   HEMATOCRIT 39.1   MCV 90.9   MCH 30.2   RDW 45.2   PLATELETCT 171   MPV 11.0   NEUTSPOLYS 83.30*   LYMPHOCYTES 6.70*   MONOCYTES 9.00   EOSINOPHILS 0.10   BASOPHILS 0.40     Recent Labs     04/02/22  1245   SODIUM 134*   POTASSIUM 3.7   CHLORIDE 102   CO2 20   GLUCOSE 104*   BUN 8     Recent Labs     04/02/22  1245   ALBUMIN 3.7   TBILIRUBIN 0.8   ALKPHOSPHAT 56   TOTPROTEIN 6.4   ALTSGPT 7   ASTSGOT 11*   CREATININE 0.79       Imaging:   MR-MRA NECK-WITH   Final Result      1.  Findings highly suspicious for occlusion of the peripheral portion of the LEFT internal jugular vein as seen on the outside CT. This could be sterile or related to septic thrombophlebitis.   2.  Inflammatory changes in the LEFT neck with enlargement of adjacent lymph nodes likely infectious with components of cellulitis and myositis, less likely primary peritonitis.   3.  Unremarkable appearance of the internal carotid arteries   4.  If further imaging assessment is pursued, repeat contrast enhanced neck CT is recommended. Ultrasound may also be of value.      CT-FOREIGN FILM CAT SCAN   Final Result      CT-FOREIGN FILM CAT SCAN   Final Result      US-EXTREMITY VENOUS UPPER UNILAT LEFT    (Results Pending)   DX-CHEST-2 VIEWS    (Results Pending)         Previous  Data Review: reviewed    Problem Representation:   Tahira is a 26 y.o. 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       * Sepsis (HCC)- (present on admission)  Assessment & Plan  This is Sepsis Present on admission  SIRS criteria identified on my evaluation include: Fever, with temperature greater than 101 deg F, Tachycardia, with heart rate greater than 90 BPM and Leukocytosis, with WBC greater than 12,000  Source is likely IJV thrombophlebitis  Sepsis protocol initiated  Fluid resuscitation ordered per protocol  Crystalloid Fluid Administration: s/p 2 L IVF  LA neg  Procal Neg    -Admit to tele  -prn tylenol for fever  -IV zosyn for suspected Lemierre's          Lemierre syndrome  Assessment & Plan  History of pharyngitis prior to symptoms concerning for possible Lemierre's  Bl cx drawn at OSH  S/p 1 dose C3 at OSH  Started on IV zosyn it the ED  ENT consulted- signed off  MRA with myositis and extensive lymphadenopathy also with IJV thrombus.    -consult ID  -continue zosyn  -f/u OSH blood cx  -CXR to assess septic emboli

## 2022-04-02 NOTE — ED TRIAGE NOTES
Transfer by EMS from [facility] for ENT/vascular surgery consult. Since yesterday with left ear pain and small amount of clear fluid draining, left neck with edema, painful to swallow left side.     [facility] gave 2L of NS, Dilaudid 1 mg, Zofran 4 mg, Ceftriaxone 2 grams IV.    EMS vs: /66, , 98% RA, GCS 15.

## 2022-04-02 NOTE — ED NOTES
Med rec updated and complete . Allergies reviewed. Confirmed name and date of birth.  No antibiotic use in lats 30 days.      Home pharmacy [facility] [phone]

## 2022-04-02 NOTE — ED NOTES
Pt states feeling improved, temp down to 100.4f orally. Covid swab obtained and sent to lab. Friend at bedside.

## 2022-04-02 NOTE — CONSULTS
Date of Service:  4/2/22    PCP: ARIS Gonzalez    CC:  Otitis externa AS, neck swelling     HPI: This is a 26 y.o. female with 2 day history of L neck pain, swelling and L ear pain.  She swims. She had some clear drainage.  She saw [facility] previously for possible tube in the winter.  Transfer from [facility] for L neck swelling and AOE, possible lemieres.      ROS: As above. The remainder of a complete review of systems is negative in all systems except as noted.    PMHx:  Active Ambulatory Problems     Diagnosis Date Noted   • Migraine without aura and without status migrainosus, not intractable 09/29/2021   • Pressure sensation in both ears 09/29/2021   • Pain of toe of left foot 09/29/2021     Resolved Ambulatory Problems     Diagnosis Date Noted   • Closed displaced fracture of head of radius with routine healing 03/01/2021   • Sprain of ulnar collateral ligament of right elbow 03/01/2021     Past Medical History:   Diagnosis Date   • Migraine        SHx:  Social History     Socioeconomic History   • Marital status: Single     Spouse name: Not on file   • Number of children: Not on file   • Years of education: Not on file   • Highest education level: Not on file   Occupational History   • Not on file   Tobacco Use   • Smoking status: Never Smoker   • Smokeless tobacco: Never Used   Substance and Sexual Activity   • Alcohol use: Yes     Comment: occasionally   • Drug use: Yes     Types: Marijuana, Inhaled     Comment: 3x weekly   • Sexual activity: Yes     Partners: Male     Birth control/protection: None     Comment: Single   Other Topics Concern   • Not on file   Social History Narrative   • Not on file     Social Determinants of Health     Financial Resource Strain: Not on file   Food Insecurity: Not on file   Transportation Needs: Not on file   Physical Activity: Not on file   Stress: Not on file   Social Connections: Not on file   Intimate Partner Violence: Not on file   Housing Stability: Not  "on file       FHx:  family history includes Diabetes in her maternal grandfather; Heart Disease in her paternal grandfather; Hyperlipidemia in her father; No Known Problems in her brother, mother, and paternal grandmother.    Allergies:  No Known Allergies    Medications:  No current facility-administered medications on file prior to encounter.     Current Outpatient Medications on File Prior to Encounter   Medication Sig Dispense Refill   • SUMATRIPTAN SUCCINATE PO Take  by mouth.         Objective Exam:  Vitals:    04/02/22 1211   BP: 143/100   Pulse: (!) 121   Resp: 18   Temp: (!) 39.6 °C (103.3 °F)   TempSrc: Oral   SpO2: 94%   Weight: 70.3 kg (155 lb)   Height: 1.6 m (5' 3\")       General: She appears unwell.   Restricted neck ROM.  Tender L neck with adenopathy  No trismus. Normal oc/op.  Normal EAC, TM.  nontender L auricle.        Laboratory--reviewed personally and are as follows:  Lab Results   Component Value Date/Time    WBC 14.0 (H) 04/02/2022 12:45 PM    RBC 4.30 04/02/2022 12:45 PM    HEMOGLOBIN 13.0 04/02/2022 12:45 PM    HEMATOCRIT 39.1 04/02/2022 12:45 PM    MCV 90.9 04/02/2022 12:45 PM    MCH 30.2 04/02/2022 12:45 PM    MCHC 33.2 (L) 04/02/2022 12:45 PM    MPV 11.0 04/02/2022 12:45 PM    NEUTSPOLYS 83.30 (H) 04/02/2022 12:45 PM    LYMPHOCYTES 6.70 (L) 04/02/2022 12:45 PM    MONOCYTES 9.00 04/02/2022 12:45 PM    EOSINOPHILS 0.10 04/02/2022 12:45 PM    BASOPHILS 0.40 04/02/2022 12:45 PM      Lab Results   Component Value Date/Time    SODIUM 134 (L) 04/02/2022 12:45 PM    POTASSIUM 3.7 04/02/2022 12:45 PM    CHLORIDE 102 04/02/2022 12:45 PM    CO2 20 04/02/2022 12:45 PM    GLUCOSE 104 (H) 04/02/2022 12:45 PM    BUN 8 04/02/2022 12:45 PM    CREATININE 0.79 04/02/2022 12:45 PM      Lab Results   Component Value Date/Time    PROTHROMBTM 15.2 (H) 04/02/2022 12:55 PM    INR 1.24 (H) 04/02/2022 12:55 PM        Radiology  LAD In L neck.  Loss of flow in L IJ, no hyperdense clot.    Normal mastoid / eac/ tm. "       MDM:  26 y.o. female here with febrile illness and lymphadenopathy.  Possible lemiere's.   She has L tender lymphadenopathy.  No drainable collection.    Normal eac / does not appear to have otitis externa.      Plan  Broad spectrum Abx.   MRV to eval IJ flow. IF clot, please consult heme onc for possible anticoagulation   Will sign off, reconsult if surgical indication develop.

## 2022-04-03 ENCOUNTER — APPOINTMENT (OUTPATIENT)
Dept: RADIOLOGY | Facility: MEDICAL CENTER | Age: 27
DRG: 872 | End: 2022-04-03
Payer: COMMERCIAL

## 2022-04-03 LAB
ALBUMIN SERPL BCP-MCNC: 3.5 G/DL (ref 3.2–4.9)
ALBUMIN/GLOB SERPL: 1.2 G/DL
ALP SERPL-CCNC: 61 U/L (ref 30–99)
ALT SERPL-CCNC: 8 U/L (ref 2–50)
ANION GAP SERPL CALC-SCNC: 12 MMOL/L (ref 7–16)
AST SERPL-CCNC: 12 U/L (ref 12–45)
BASOPHILS # BLD AUTO: 0.5 % (ref 0–1.8)
BASOPHILS # BLD: 0.08 K/UL (ref 0–0.12)
BILIRUB SERPL-MCNC: 1.3 MG/DL (ref 0.1–1.5)
BUN SERPL-MCNC: 6 MG/DL (ref 8–22)
CALCIUM SERPL-MCNC: 8.2 MG/DL (ref 8.5–10.5)
CHLORIDE SERPL-SCNC: 101 MMOL/L (ref 96–112)
CO2 SERPL-SCNC: 21 MMOL/L (ref 20–33)
CREAT SERPL-MCNC: 0.89 MG/DL (ref 0.5–1.4)
EOSINOPHIL # BLD AUTO: 0.13 K/UL (ref 0–0.51)
EOSINOPHIL NFR BLD: 0.8 % (ref 0–6.9)
ERYTHROCYTE [DISTWIDTH] IN BLOOD BY AUTOMATED COUNT: 46.5 FL (ref 35.9–50)
GFR SERPLBLD CREATININE-BSD FMLA CKD-EPI: 92 ML/MIN/1.73 M 2
GLOBULIN SER CALC-MCNC: 2.9 G/DL (ref 1.9–3.5)
GLUCOSE SERPL-MCNC: 100 MG/DL (ref 65–99)
HCG SERPL QL: NEGATIVE
HCT VFR BLD AUTO: 38.3 % (ref 37–47)
HGB BLD-MCNC: 12.4 G/DL (ref 12–16)
IMM GRANULOCYTES # BLD AUTO: 0.08 K/UL (ref 0–0.11)
IMM GRANULOCYTES NFR BLD AUTO: 0.5 % (ref 0–0.9)
LYMPHOCYTES # BLD AUTO: 1.33 K/UL (ref 1–4.8)
LYMPHOCYTES NFR BLD: 8.1 % (ref 22–41)
MCH RBC QN AUTO: 30 PG (ref 27–33)
MCHC RBC AUTO-ENTMCNC: 32.4 G/DL (ref 33.6–35)
MCV RBC AUTO: 92.7 FL (ref 81.4–97.8)
MONOCYTES # BLD AUTO: 1.38 K/UL (ref 0–0.85)
MONOCYTES NFR BLD AUTO: 8.4 % (ref 0–13.4)
NEUTROPHILS # BLD AUTO: 13.36 K/UL (ref 2–7.15)
NEUTROPHILS NFR BLD: 81.7 % (ref 44–72)
NRBC # BLD AUTO: 0 K/UL
NRBC BLD-RTO: 0 /100 WBC
PLATELET # BLD AUTO: 172 K/UL (ref 164–446)
PMV BLD AUTO: 12 FL (ref 9–12.9)
POTASSIUM SERPL-SCNC: 3.8 MMOL/L (ref 3.6–5.5)
PROT SERPL-MCNC: 6.4 G/DL (ref 6–8.2)
RBC # BLD AUTO: 4.13 M/UL (ref 4.2–5.4)
SODIUM SERPL-SCNC: 134 MMOL/L (ref 135–145)
WBC # BLD AUTO: 16.4 K/UL (ref 4.8–10.8)

## 2022-04-03 PROCEDURE — 700111 HCHG RX REV CODE 636 W/ 250 OVERRIDE (IP)

## 2022-04-03 PROCEDURE — 700102 HCHG RX REV CODE 250 W/ 637 OVERRIDE(OP)

## 2022-04-03 PROCEDURE — 85025 COMPLETE CBC W/AUTO DIFF WBC: CPT

## 2022-04-03 PROCEDURE — 770020 HCHG ROOM/CARE - TELE (206)

## 2022-04-03 PROCEDURE — 99221 1ST HOSP IP/OBS SF/LOW 40: CPT | Performed by: INTERNAL MEDICINE

## 2022-04-03 PROCEDURE — 700117 HCHG RX CONTRAST REV CODE 255

## 2022-04-03 PROCEDURE — 71275 CT ANGIOGRAPHY CHEST: CPT

## 2022-04-03 PROCEDURE — 80053 COMPREHEN METABOLIC PANEL: CPT

## 2022-04-03 PROCEDURE — 99233 SBSQ HOSP IP/OBS HIGH 50: CPT | Mod: GC | Performed by: INTERNAL MEDICINE

## 2022-04-03 PROCEDURE — A9270 NON-COVERED ITEM OR SERVICE: HCPCS

## 2022-04-03 PROCEDURE — 70491 CT SOFT TISSUE NECK W/DYE: CPT

## 2022-04-03 PROCEDURE — 700105 HCHG RX REV CODE 258

## 2022-04-03 PROCEDURE — 36415 COLL VENOUS BLD VENIPUNCTURE: CPT

## 2022-04-03 RX ORDER — CYCLOBENZAPRINE HCL 10 MG
10 TABLET ORAL 3 TIMES DAILY PRN
Status: DISCONTINUED | OUTPATIENT
Start: 2022-04-03 | End: 2022-04-07 | Stop reason: HOSPADM

## 2022-04-03 RX ADMIN — ENOXAPARIN SODIUM 40 MG: 40 INJECTION SUBCUTANEOUS at 06:11

## 2022-04-03 RX ADMIN — OXYCODONE HYDROCHLORIDE 5 MG: 5 TABLET ORAL at 22:24

## 2022-04-03 RX ADMIN — OXYCODONE HYDROCHLORIDE 5 MG: 5 TABLET ORAL at 07:43

## 2022-04-03 RX ADMIN — PIPERACILLIN SODIUM AND TAZOBACTAM SODIUM 3.38 G: 3; .375 INJECTION, POWDER, FOR SOLUTION INTRAVENOUS at 14:53

## 2022-04-03 RX ADMIN — IOHEXOL 100 ML: 350 INJECTION, SOLUTION INTRAVENOUS at 12:30

## 2022-04-03 RX ADMIN — PIPERACILLIN SODIUM AND TAZOBACTAM SODIUM 3.38 G: 3; .375 INJECTION, POWDER, FOR SOLUTION INTRAVENOUS at 06:10

## 2022-04-03 RX ADMIN — ACETAMINOPHEN 650 MG: 325 TABLET, FILM COATED ORAL at 14:54

## 2022-04-03 RX ADMIN — PIPERACILLIN SODIUM AND TAZOBACTAM SODIUM 3.38 G: 3; .375 INJECTION, POWDER, FOR SOLUTION INTRAVENOUS at 22:19

## 2022-04-03 RX ADMIN — OXYCODONE HYDROCHLORIDE 5 MG: 5 TABLET ORAL at 13:21

## 2022-04-03 RX ADMIN — ACETAMINOPHEN 650 MG: 325 TABLET, FILM COATED ORAL at 07:39

## 2022-04-03 ASSESSMENT — ENCOUNTER SYMPTOMS
DIZZINESS: 0
HALLUCINATIONS: 0
SINUS PAIN: 0
BLURRED VISION: 0
DOUBLE VISION: 0
DEPRESSION: 0
NECK PAIN: 1
FOCAL WEAKNESS: 0
COUGH: 0
SHORTNESS OF BREATH: 0
DIARRHEA: 0
CONSTIPATION: 0
FLANK PAIN: 0
MYALGIAS: 1
WHEEZING: 0
RESPIRATORY NEGATIVE: 1
ABDOMINAL PAIN: 0
VOMITING: 0
FEVER: 1
CHILLS: 1
EYES NEGATIVE: 1
PALPITATIONS: 0
NAUSEA: 0
EYE DISCHARGE: 0
FALLS: 0
EYE PAIN: 0
WEAKNESS: 0

## 2022-04-03 ASSESSMENT — FIBROSIS 4 INDEX: FIB4 SCORE: 0.62

## 2022-04-03 ASSESSMENT — LIFESTYLE VARIABLES: SUBSTANCE_ABUSE: 0

## 2022-04-03 ASSESSMENT — PAIN DESCRIPTION - PAIN TYPE
TYPE: ACUTE PAIN
TYPE: ACUTE PAIN

## 2022-04-03 NOTE — PROGRESS NOTES
Received bedside report from NAFISA Tellez, pt care assumed. VS stable, pt assessment complete. Pt A&Ox4, chronic c/o 5/10  pain at this time. POC discussed with pt and verbalizes no questions. Pt denies any additional needs at this time. Bed locked and in lowest position, bed alarm off. Pt educated on fall risk and verbalized understanding, call light within reach, hourly rounding initiated.   Patient is sinus rhythm

## 2022-04-03 NOTE — SENIOR ADMIT NOTE
Senior Admission Note    In summary: Tahira Chi is a 26 y.o. female with a past medical history of migraine headaches and eustachian tube dysfunction who presented with a 4 d hx of sore throat that was accompanied by worsening L neck pain over the last day with onset of fever, chills, and myalgias. She denies orthopnea or sick contacts. She endorses freshwater swimming with multiple recent swims in [facility] in the mast week. She initially presented at [facility] where it was thought she had otitis externa but on further evaluation here it does not appear to be present. She was transferred from that hospital because of high fever and concern for septic thrombophlebitis of the left internal jugular vein after suspicious findings on CT imaging there. She was started on empiric treatment with zosyn, and otolaryngology and infectious diseases have been consulted.        Pertinent physical exam findings:  L posterior cervical lymphadenopathy with tenderness, L neck very tender to palpation, no exudates seen in oral cavity, neurological exam negative, L ear not consistent with otitis externa, L tympanic membrane has a yellow appearance.  Pertinent studies: MRA neck shows findings highly suspicious for occlusion of the peripheral portion of the L internal jugular vein as seen on the outside CT.        Assessment and plan:    1. 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 Zarate) and ID (Dr. Plummer) consults   - PA and lateral CXR to look for septic emboli characteristic of Lemierre syndrome              - blood cultures (drawn at [facility])              - pain control      Please see the H&P from Dr. Mendoza for full details.  Dusty Osei M.D.  PGY-2     DVT ppx: enoxaparin 40  CODE STATUS: full code

## 2022-04-03 NOTE — PROGRESS NOTES
Daily Progress Note:     Date of Service: 4/3/2022  Primary Team: [facility] IM White Team   Attending: JUSTIN Tomas   Senior Resident: Dr. Osei  Intern: Ashley Mendoza M.D.   Contact:  [phone]    ID:  Tahira is a 26 y.o. 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     Subjective  Patient was febrile overnight with temp >101. She states she is very sweaty this morning and her hospital gown is drenched.     She is also complaining of expansion of her pain into her mediastinal area. Denies sob, palpitations, GRIFFITH.     Overall says she feels about the same as she did yesterday when she initially presented.     Continues to deny n/v/d, focal neuro weakness or changes.    Consultants/Specialty:  ID    Review of Systems:    Review of Systems   Constitutional: Positive for chills, fever and malaise/fatigue.   HENT: Negative for congestion, ear pain, nosebleeds and sinus pain.    Eyes: Negative for blurred vision, double vision, pain and discharge.   Respiratory: Negative for cough, shortness of breath and wheezing.    Cardiovascular: Negative for chest pain, palpitations and leg swelling.   Gastrointestinal: Negative for abdominal pain, constipation, diarrhea, nausea and vomiting.   Genitourinary: Negative for dysuria, flank pain, frequency, hematuria and urgency.   Musculoskeletal: Positive for myalgias and neck pain. Negative for falls.   Skin: Negative for rash.   Neurological: Negative for dizziness, focal weakness and weakness.   Psychiatric/Behavioral: Negative for depression, hallucinations and substance abuse.       Objective Data:   Physical Exam:   Vitals:   Temp:  [36.7 °C (98 °F)-39.6 °C (103.3 °F)] 36.7 °C (98.1 °F)  Pulse:  [] 88  Resp:  [16-18] 18  BP: (100-143)/() 114/75  SpO2:  [92 %-97 %] 97  %    Physical Exam  Constitutional:       General: She is not in acute distress.     Appearance: She is ill-appearing and diaphoretic.   HENT:      Head: Normocephalic and atraumatic.      Ears:      Comments: Left external ear with yellow crusting. No visible active draining. Tragus non-tender. Normal EAC and tympanic membrane poorly visualized though no appreciable fluid fluid. Severe posterior auricular tenderness.      Mouth/Throat:      Mouth: Mucous membranes are moist.      Pharynx: Oropharynx is clear. No oropharyngeal exudate or posterior oropharyngeal erythema.   Eyes:      General: No scleral icterus.        Right eye: No discharge.         Left eye: No discharge.      Extraocular Movements: Extraocular movements intact.      Conjunctiva/sclera: Conjunctivae normal.      Pupils: Pupils are equal, round, and reactive to light.   Neck:      Comments: Visibly swollen L neck with swollen and tender extensive lymphadenopathy including the postauricular and posterior cervical lymphnodes.  ROM limited due to pain and swelling  Cardiovascular:      Rate and Rhythm: Tachycardia present.      Pulses: Normal pulses.      Heart sounds: Normal heart sounds. No murmur heard.  Pulmonary:      Effort: Pulmonary effort is normal. No respiratory distress.      Breath sounds: Normal breath sounds.      Comments: Tenderness to palpation over the mediastinum  Chest:      Chest wall: Tenderness present.   Abdominal:      General: Abdomen is flat. Bowel sounds are normal. There is no distension.      Palpations: Abdomen is soft.      Tenderness: There is no abdominal tenderness. There is no guarding.      Comments: No hepatosplenomegally   Musculoskeletal:         General: No swelling, tenderness, deformity or signs of injury. Normal range of motion.      Right lower leg: No edema.      Left lower leg: No edema.   Skin:     General: Skin is warm.      Findings: No lesion or rash.   Neurological:      General: No focal deficit  present.      Mental Status: She is alert and oriented to person, place, and time.      Cranial Nerves: No cranial nerve deficit.      Sensory: No sensory deficit.      Motor: No weakness.   Psychiatric:         Mood and Affect: Mood normal.         Behavior: Behavior normal.         Thought Content: Thought content normal.         Judgment: Judgment normal.       Labs:   Recent Labs     04/02/22 1245 04/03/22  0050   WBC 14.0* 16.4*   RBC 4.30 4.13*   HEMOGLOBIN 13.0 12.4   HEMATOCRIT 39.1 38.3   MCV 90.9 92.7   MCH 30.2 30.0   RDW 45.2 46.5   PLATELETCT 171 172   MPV 11.0 12.0   NEUTSPOLYS 83.30* 81.70*   LYMPHOCYTES 6.70* 8.10*   MONOCYTES 9.00 8.40   EOSINOPHILS 0.10 0.80   BASOPHILS 0.40 0.50     Recent Labs     04/02/22  1245 04/03/22  0050   SODIUM 134* 134*   POTASSIUM 3.7 3.8   CHLORIDE 102 101   CO2 20 21   GLUCOSE 104* 100*   BUN 8 6*     Recent Labs     04/02/22  1245 04/03/22  0050   ALBUMIN 3.7 3.5   TBILIRUBIN 0.8 1.3   ALKPHOSPHAT 56 61   TOTPROTEIN 6.4 6.4   ALTSGPT 7 8   ASTSGOT 11* 12   CREATININE 0.79 0.89         Imaging:   US-EXTREMITY VENOUS UPPER UNILAT LEFT   Final Result      DX-CHEST-2 VIEWS   Final Result      No acute cardiopulmonary abnormality.      MR-MRA NECK-WITH   Final Result      1.  Findings highly suspicious for occlusion of the peripheral portion of the LEFT internal jugular vein as seen on the outside CT. This could be sterile or related to septic thrombophlebitis.   2.  Inflammatory changes in the LEFT neck with enlargement of adjacent lymph nodes likely infectious with components of cellulitis and myositis, less likely primary peritonitis.   3.  Unremarkable appearance of the internal carotid arteries   4.  If further imaging assessment is pursued, repeat contrast enhanced neck CT is recommended. Ultrasound may also be of value.      CT-FOREIGN FILM CAT SCAN   Final Result      CT-FOREIGN FILM CAT SCAN   Final Result      CT-CTA CHEST PULMONARY ARTERY W/ RECONS    (Results  Pending)   CT-SOFT TISSUE NECK WITH    (Results Pending)       Problem Representation:   Tahira is a 26 y.o. female with a PMH of migraine (nothing recent), otherwise healthy presenting with a complaint of acute onset severe left neck and ear pain found to have 3/4 SIRS with  IJV thrombus and s/s concerning for Lemierre syndrome, on zosyn (started 4/2), with continued fevers, tachycardia, and expanding tenderness her mediastinum.    * Sepsis (HCC)- (present on admission)  Assessment & Plan  This is Sepsis Present on admission  SIRS criteria identified on my evaluation include: Fever, with temperature greater than 101 deg F, Tachycardia, with heart rate greater than 90 BPM and Leukocytosis, with WBC greater than 12,000  Source is likely IJV thrombophlebitis  Sepsis protocol initiated  Fluid resuscitation ordered per protocol  Crystalloid Fluid Administration: s/p 2 L IVF  LA neg  Procal Neg  WBC up trending 16.4 from 14 at admission  HD stable though with continued tacycardia  Ear fluid cx 4/2: NGTD  Blood culture from OSH- unknown results  Blood cx 4/3- NGTD.    -Admit to tele  -prn tylenol for fever  -IV zosyn for suspected Lemierre's          Lemierre syndrome  Assessment & Plan  History of pharyngitis prior to symptoms concerning for possible Lemierre's  Bl cx drawn at OSH  S/p 1 dose C3 at OSH  Started on IV zosyn 4/2  ENT consulted- signed off  MRA with myositis and extensive lymphadenopathy also with IJV thrombus.  ID following  Now with extension of pain into mediastinal area. Have low clinical suspicion, however known complication of Lemierre's is pulmonary involvement either from septic emboli, pulm inifiltrate, penumothorax, or necrotizing mediastinitis. Given ongoing tacy, febrile, and expansion of pain will get CT chest w cont and soft tissue CT neck to further evaluate.      -appreciated ID recs  -continue zosyn- abx duration currently unknown, though will likely require weeks.   -no indication for  anticoagulation at this time, if continues to have fever and elevated white count after 5 days of abx may need to reassess need for AC.   -f/u OSH blood cx  -CT PE and soft tissue neck

## 2022-04-03 NOTE — ASSESSMENT & PLAN NOTE
This is Sepsis Present on admission  SIRS criteria identified on my evaluation include: Fever, with temperature greater than 101 deg F, Tachycardia, with heart rate greater than 90 BPM and Leukocytosis, with WBC greater than 12,000  Source is likely IJV thrombophlebitis  Sepsis protocol initiated  Fluid resuscitation ordered per protocol  Crystalloid Fluid Administration: s/p 2 L IVF  LA neg  Procal Neg  WBC up trending 16.4 from 14 at admission but down trended on 4/4 to 13.2 and normalized on 4/5.   HD stable though with continued tacycardia  Ear fluid cx 4/2: NGTD  Blood culture from OSH- unknown results  Blood cx 4/3- NGTD.  Fevers and tachycardia resolved 4/4  -prn tylenol for fever  -IV zosyn for Lemierre's, transition to PO antibiotics per ID upon discharge   -CTM

## 2022-04-03 NOTE — NON-PROVIDER
Daily Progress Note:     Date of Service: 4/3/2022  Primary Team: [facility] White Team   Attending: JUSTIN Tomas   Senior Resident: Dr. Osei  Intern: Dr. Mendoza  Contact:  [phone]    Chief Complaint:   Left ear and neck pain    Subjective  Overnight, Tahira remained febrile despite Tylenol administration. This morning, her neck pain was still present, and she described some new pain descending into her anterior mediastinal area. Her ROM in her neck is restricted due to swelling/pain.    Consultants/Specialty:  Infectious Disease    Review of Systems:  Review of Systems   Eyes: Negative.    Respiratory: Negative.    Cardiovascular: Positive for chest pain.        Pain induced via palpation of the anterior mediastinum.    Gastrointestinal: Negative for nausea and vomiting.   Genitourinary: Negative.    Musculoskeletal: Positive for neck pain.       Objective Data:   Physical Exam:   Vitals:   Temp:  [37.6 °C (99.6 °F)-39.6 °C (103.3 °F)] 38.6 °C (101.5 °F)  Pulse:  [110-121] 118  Resp:  [16-18] 16  BP: (100-143)/() 138/86  SpO2:  [92 %-97 %] 95 %    Physical Exam  HENT:      Nose: Nose normal.      Mouth/Throat:      Mouth: Mucous membranes are moist.      Pharynx: Posterior oropharyngeal erythema present. No oropharyngeal exudate.   Cardiovascular:      Rate and Rhythm: Regular rhythm. Tachycardia present.   Pulmonary:      Effort: Pulmonary effort is normal.      Breath sounds: Normal breath sounds.   Musculoskeletal:      Cervical back: Rigidity and tenderness present.   Lymphadenopathy:      Cervical: Cervical adenopathy present.   Skin:     General: Skin is warm.   Neurological:      Mental Status: She is oriented to person, place, and time.   Psychiatric:         Mood and Affect: Mood normal.         Behavior: Behavior normal.           Labs/Imaging:  Results for orders placed or performed during the hospital encounter of 04/02/22   CBC With Differential   Result Value Ref Range     WBC 14.0 (H) 4.8 - 10.8 K/uL    RBC 4.30 4.20 - 5.40 M/uL    Hemoglobin 13.0 12.0 - 16.0 g/dL    Hematocrit 39.1 37.0 - 47.0 %    MCV 90.9 81.4 - 97.8 fL    MCH 30.2 27.0 - 33.0 pg    MCHC 33.2 (L) 33.6 - 35.0 g/dL    RDW 45.2 35.9 - 50.0 fL    Platelet Count 171 164 - 446 K/uL    MPV 11.0 9.0 - 12.9 fL    Neutrophils-Polys 83.30 (H) 44.00 - 72.00 %    Lymphocytes 6.70 (L) 22.00 - 41.00 %    Monocytes 9.00 0.00 - 13.40 %    Eosinophils 0.10 0.00 - 6.90 %    Basophils 0.40 0.00 - 1.80 %    Immature Granulocytes 0.50 0.00 - 0.90 %    Nucleated RBC 0.00 /100 WBC    Neutrophils (Absolute) 11.68 (H) 2.00 - 7.15 K/uL    Lymphs (Absolute) 0.94 (L) 1.00 - 4.80 K/uL    Monos (Absolute) 1.26 (H) 0.00 - 0.85 K/uL    Eos (Absolute) 0.01 0.00 - 0.51 K/uL    Baso (Absolute) 0.06 0.00 - 0.12 K/uL    Immature Granulocytes (abs) 0.07 0.00 - 0.11 K/uL    NRBC (Absolute) 0.00 K/uL   Comp Metabolic Panel   Result Value Ref Range    Sodium 134 (L) 135 - 145 mmol/L    Potassium 3.7 3.6 - 5.5 mmol/L    Chloride 102 96 - 112 mmol/L    Co2 20 20 - 33 mmol/L    Anion Gap 12.0 7.0 - 16.0    Glucose 104 (H) 65 - 99 mg/dL    Bun 8 8 - 22 mg/dL    Creatinine 0.79 0.50 - 1.40 mg/dL    Calcium 8.1 (L) 8.5 - 10.5 mg/dL    AST(SGOT) 11 (L) 12 - 45 U/L    ALT(SGPT) 7 2 - 50 U/L    Alkaline Phosphatase 56 30 - 99 U/L    Total Bilirubin 0.8 0.1 - 1.5 mg/dL    Albumin 3.7 3.2 - 4.9 g/dL    Total Protein 6.4 6.0 - 8.2 g/dL    Globulin 2.7 1.9 - 3.5 g/dL    A-G Ratio 1.4 g/dL   Lactic Acid   Result Value Ref Range    Lactic Acid 1.0 0.5 - 2.0 mmol/L   Lactic Acid   Result Value Ref Range    Lactic Acid 0.9 0.5 - 2.0 mmol/L   PTT   Result Value Ref Range    APTT 31.1 24.7 - 36.0 sec   Prothrombin Time   Result Value Ref Range    PT 15.2 (H) 12.0 - 14.6 sec    INR 1.24 (H) 0.87 - 1.13   CULTURE WOUND W/ GRAM STAIN    Specimen: Exudate; Wound   Result Value Ref Range    Significant Indicator NEG     Source WND     Site EXUDATE; left ear     Culture Result -      Gram Stain Result       Moderate Gram positive cocci.  Many Gram positive rods.     ESTIMATED GFR   Result Value Ref Range    GFR (CKD-EPI) 106 >60 mL/min/1.73 m 2   COV-2, FLU A/B, AND RSV BY PCR (2-4 HOURS CEPHEID): Collect NP swab in VTM    Specimen: Nasopharyngeal; Respirate   Result Value Ref Range    Influenza virus A RNA Negative Negative    Influenza virus B, PCR Negative Negative    RSV, PCR Negative Negative    SARS-CoV-2 by PCR NotDetected     SARS-CoV-2 Source NP Swab    MONONUCLEOSIS TEST QUAL   Result Value Ref Range    Heterophile Screen Negative Negative   GRAM STAIN    Specimen: Wound   Result Value Ref Range    Significant Indicator .     Source WND     Site EXUDATE; left ear     Gram Stain Result       Moderate Gram positive cocci.  Many Gram positive rods.     CBC with Differential   Result Value Ref Range    WBC 16.4 (H) 4.8 - 10.8 K/uL    RBC 4.13 (L) 4.20 - 5.40 M/uL    Hemoglobin 12.4 12.0 - 16.0 g/dL    Hematocrit 38.3 37.0 - 47.0 %    MCV 92.7 81.4 - 97.8 fL    MCH 30.0 27.0 - 33.0 pg    MCHC 32.4 (L) 33.6 - 35.0 g/dL    RDW 46.5 35.9 - 50.0 fL    Platelet Count 172 164 - 446 K/uL    MPV 12.0 9.0 - 12.9 fL    Neutrophils-Polys 81.70 (H) 44.00 - 72.00 %    Lymphocytes 8.10 (L) 22.00 - 41.00 %    Monocytes 8.40 0.00 - 13.40 %    Eosinophils 0.80 0.00 - 6.90 %    Basophils 0.50 0.00 - 1.80 %    Immature Granulocytes 0.50 0.00 - 0.90 %    Nucleated RBC 0.00 /100 WBC    Neutrophils (Absolute) 13.36 (H) 2.00 - 7.15 K/uL    Lymphs (Absolute) 1.33 1.00 - 4.80 K/uL    Monos (Absolute) 1.38 (H) 0.00 - 0.85 K/uL    Eos (Absolute) 0.13 0.00 - 0.51 K/uL    Baso (Absolute) 0.08 0.00 - 0.12 K/uL    Immature Granulocytes (abs) 0.08 0.00 - 0.11 K/uL    NRBC (Absolute) 0.00 K/uL   Comp Metabolic Panel (CMP)   Result Value Ref Range    Sodium 134 (L) 135 - 145 mmol/L    Potassium 3.8 3.6 - 5.5 mmol/L    Chloride 101 96 - 112 mmol/L    Co2 21 20 - 33 mmol/L    Anion Gap 12.0 7.0 - 16.0    Glucose 100 (H) 65  - 99 mg/dL    Bun 6 (L) 8 - 22 mg/dL    Creatinine 0.89 0.50 - 1.40 mg/dL    Calcium 8.2 (L) 8.5 - 10.5 mg/dL    AST(SGOT) 12 12 - 45 U/L    ALT(SGPT) 8 2 - 50 U/L    Alkaline Phosphatase 61 30 - 99 U/L    Total Bilirubin 1.3 0.1 - 1.5 mg/dL    Albumin 3.5 3.2 - 4.9 g/dL    Total Protein 6.4 6.0 - 8.2 g/dL    Globulin 2.9 1.9 - 3.5 g/dL    A-G Ratio 1.2 g/dL   ESTIMATED GFR   Result Value Ref Range    GFR (CKD-EPI) 92 >60 mL/min/1.73 m 2         Problem Representation:  Tahira is a previously healthy 26 year old female with a pmhx of migraines tx with Sumaptriptan (none recently), presenting with acute onset of severe left ear and neck pain with 3/4 SIRS criteria, internal jugular vein thrombus on CT, and s/s of Lemierre syndrome.     Sepsis  #SIRS 3/4 with leukocytosis (>12,000), tachycardia (>90BPM), and fever (>101F)  The patient originally presented to [facility] for symptoms, where she was treated with Rocephin after a blood culture was obtained.     · 4/3/22: WBC trending up from 14 to 16.4, pro calcitonin and lactic acid negative, continued tachycardia and fever. Fluid resuscitation ordered, 2L IVF    Pending blood culture results from OSH, and [facility]  Continue Tylenol prn, and IV Zosyn    Lemierre Syndrome  Tahira had pharyngitis 10 days prior to the onset of symptoms, that lasted for 4 days with no other symptoms, and resolved spontaneously. This along with current sequela, are concerning for Lemierre's (Internal jugular vein thrombus).    · 4/3/22: Pain has extended into the anterior mediastinum. Case reports have shown complications of pulmonary involvement, septic emboli, pulmonary infiltrate, pneumothorax, or necrotizing mediastinitis (Uptodate). Due to the severity of these complications, a CT of the chest and neck with contrast will be ordered.       Infectious disease consulted, and recommended continuation of IV Zosyn, follow up on blood cultures,revaluate indication for anticoagulation if  fever continues and elevated white count after 5 days of appropriate antibiotics.     Note Author  Patricia Severino, MS3

## 2022-04-03 NOTE — CARE PLAN
The patient is Stable - Low risk of patient condition declining or worsening    Shift Goals  Clinical Goals: Continue antibiotics, monitor blood cultures  Patient Goals: Maintain comfort    Progress made toward(s) clinical / shift goals:  Progressing      Problem: Pain - Standard  Goal: Alleviation of pain or a reduction in pain to the patient’s comfort goal  Outcome: Progressing     Problem: Knowledge Deficit - Standard  Goal: Patient and family/care givers will demonstrate understanding of plan of care, disease process/condition, diagnostic tests and medications  Outcome: Progressing

## 2022-04-03 NOTE — CONSULTS
DATE OF SERVICE:  04/03/2022     INFECTIOUS DISEASE CONSULTATION     REQUESTING PHYSICIAN:  Stacey Caballero MD     IDENTIFICATION:  A 26-year-old female seen for evaluation for possible   Lemierre syndrome.     HISTORY OF PRESENT ILLNESS:  This is a previously healthy 26-year-old female   with no significant past history except she did have COVID with a sore throat   in mid January. She had a prior history of having the J and J vaccine.  She   was tested by both antigen and PCR testing, which confirmed her infection.    The patient otherwise has been feeling well.  She does go swimming in [facility], [place] and over the last few days, started having sore throat, irritation in   the back and then about a day or 2 prior to her presentation yesterday,   started having increasing pain and swelling on the left part of her neck.  She   was seen at [facility] and blood cultures were obtained. She was   given a dose of Rocephin and sent here for further evaluation by ENT.  When   she came to the emergency room here, her temperature was 39.6.  She was   tachycardic and was seen in consultation by Dr. Main Zarate from ENT.  When   he examined her, found her to be ill-appearing and she had a restricted neck   range of motion and tender left neck with adenopathy.  No trismus. Normal,   nontender left auricle. Normal external canal and TMs. White count was 14,000.    He noted that the radiology report shows loss of flow in the left internal   jugular with no hyperdense clot and with her febrile illness lymphadenopathy   and pain, he felt that she most likely had Lemierre's syndrome with no   drainable collection.  Recommend broad-spectrum antibiotics and continued   treatment.  The patient was appropriately placed on piperacillin/tazobactam,   is continued on it and this morning is seen, she did have a fever overnight   and temperature has come down, but she continues to have significant pain in   her left neck  area, difficult for her to turn her neck.  Denies any hearing   problems.  Denies problems swallowing.  Denies any cough.  She denies any   joint problems, any rash.  She does note, however, when she takes a deep   breath that there is some discomfort in the substernal area.     PAST MEDICAL HISTORY:  Unremarkable except for the COVID as noted above.  She   also has a history of migraines.     PAST SURGICAL HISTORY:  She had a displaced fracture of the head of the   radius.     SOCIAL HISTORY:  She works for the [employer]. No smoking.     MEDICATIONS:  At present, in terms of antibiotics are piperacillin/tazobactam   3.375 grams  every 8 hours and pain medicines.     PHYSICAL EXAMINATION:  VITAL SIGNS:  Her T-max early this morning was 38.6.  She is presently   afebrile, pulse 104, blood pressure 108/70.  GENERAL:  She is somewhat uncomfortable appearing because of her neck   discomfort.  She is somewhat diaphoretic.  HEENT:  Her speech is clear.  I see no oral lesions, but not able to view her   posterior pharynx. Exam is remarkable for a definite swelling and tenderness   over the upper left neck below the jaw.  Her ears appear fine.  LUNGS:  Clear, even over the site of chest discomfort.  CARDIOVASCULAR:   No heart murmurs appreciated.  SKIN:  Unremarkable for any rash or joint abnormalities.     LABORATORY DATA:  Show white count of 16.4 this morning up from 14,000   yesterday.  Her chemistry panel is unremarkable.  Normal renal function.    Blood cultures were drawn here, but that was after getting the Rocephin at   [facility] and they are negative to date and she has a left ear exudate   culture pending.  She did have an MRI here with the conclusion by the   Radiologist of  findings highly suspicious for occlusion of the peripheral   portion of the left internal jugular vein as seen on the outside CAT scan,   could be related to septic thrombophlebitis.  There are also  inflammatory   changes in the left neck with enlargement of adjacent lymph nodes, likely   infectious with component of cellulitis and myositis, less likely primary   mediastinitis.  Unremarkable appearance of the internal carotid arteries.     ASSESSMENT AND PLAN:  A 26-year-old patient who presents with a sore throat,   followed by a severe left neck pain and fever, chills and leukocytosis with a   tender, swollen left internal jugular area on exam, presumptively Lemierre's   syndrome.  It is interesting that she had COVID just two months ago and we   know that even in mild cases, there may  Be  a hypercoagulable state for an extended   period after the case and this may have contributed to her development of a   clot.  She is on appropriate antibiotics since we are particularly  interested   in covering Fusobacterium necrophorum.  She did have cultures done before   antibiotics at [facility] and we have to follow up on that, but assuming   that this diagnosis is correct, we will be looking at probably 2 weeks of IV   and possibly oral antibiotics for complete resolution of this infection.  I   have discussed this with her and the medical team and we will continue Zosyn   for now while we further await culture results and chest CAT scan, which is   scheduled for today.Her chest x-ray did not show any septic emboli, but   the CT scan may give us a closer look.        ______________________________  MD JAMEY PAYNE/YUDY/BALWINDER    DD:  04/03/2022 09:34  DT:  04/03/2022 11:08    Job#:  [id number]

## 2022-04-03 NOTE — ASSESSMENT & PLAN NOTE
History of pharyngitis prior to presenting symptoms concerning for possible Lemierre's. MRA revealed inflammation and extensive lymphadenopathy. Started on IV zosyn 4/2. Outside hospital cultures and inpatient cultures with no growth to date. ENT consulted- signed off, ID continues to follow. Range of motion and neck tenderness improved.  -ID recs appreciated  -Managed with IV zosyn while inpatient, transition to augmentin 875mg BID upon discharge for 2 weeks per ID    -no indication for anticoagulation at this time, will reassess depending on symptoms  -f/u OSH blood cx

## 2022-04-03 NOTE — PROGRESS NOTES
Pt went down for CT chest via wheelchair, no complications noted. Pt showered after returning to room.

## 2022-04-04 LAB
ALBUMIN SERPL BCP-MCNC: 3.5 G/DL (ref 3.2–4.9)
ALBUMIN/GLOB SERPL: 1.2 G/DL
ALP SERPL-CCNC: 61 U/L (ref 30–99)
ALT SERPL-CCNC: 10 U/L (ref 2–50)
ANION GAP SERPL CALC-SCNC: 11 MMOL/L (ref 7–16)
AST SERPL-CCNC: 12 U/L (ref 12–45)
BACTERIA WND AEROBE CULT: ABNORMAL
BACTERIA WND AEROBE CULT: ABNORMAL
BASOPHILS # BLD AUTO: 0.3 % (ref 0–1.8)
BASOPHILS # BLD: 0.04 K/UL (ref 0–0.12)
BILIRUB SERPL-MCNC: 0.5 MG/DL (ref 0.1–1.5)
BUN SERPL-MCNC: 6 MG/DL (ref 8–22)
CALCIUM SERPL-MCNC: 8.3 MG/DL (ref 8.5–10.5)
CHLORIDE SERPL-SCNC: 105 MMOL/L (ref 96–112)
CO2 SERPL-SCNC: 22 MMOL/L (ref 20–33)
CREAT SERPL-MCNC: 0.67 MG/DL (ref 0.5–1.4)
EOSINOPHIL # BLD AUTO: 0.48 K/UL (ref 0–0.51)
EOSINOPHIL NFR BLD: 3.7 % (ref 0–6.9)
ERYTHROCYTE [DISTWIDTH] IN BLOOD BY AUTOMATED COUNT: 44.3 FL (ref 35.9–50)
GFR SERPLBLD CREATININE-BSD FMLA CKD-EPI: 124 ML/MIN/1.73 M 2
GLOBULIN SER CALC-MCNC: 2.9 G/DL (ref 1.9–3.5)
GLUCOSE SERPL-MCNC: 120 MG/DL (ref 65–99)
GRAM STN SPEC: ABNORMAL
HCT VFR BLD AUTO: 37.2 % (ref 37–47)
HGB BLD-MCNC: 12.1 G/DL (ref 12–16)
IMM GRANULOCYTES # BLD AUTO: 0.07 K/UL (ref 0–0.11)
IMM GRANULOCYTES NFR BLD AUTO: 0.5 % (ref 0–0.9)
LYMPHOCYTES # BLD AUTO: 1.18 K/UL (ref 1–4.8)
LYMPHOCYTES NFR BLD: 9 % (ref 22–41)
MCH RBC QN AUTO: 29.8 PG (ref 27–33)
MCHC RBC AUTO-ENTMCNC: 32.5 G/DL (ref 33.6–35)
MCV RBC AUTO: 91.6 FL (ref 81.4–97.8)
MONOCYTES # BLD AUTO: 1.04 K/UL (ref 0–0.85)
MONOCYTES NFR BLD AUTO: 7.9 % (ref 0–13.4)
NEUTROPHILS # BLD AUTO: 10.34 K/UL (ref 2–7.15)
NEUTROPHILS NFR BLD: 78.6 % (ref 44–72)
NRBC # BLD AUTO: 0 K/UL
NRBC BLD-RTO: 0 /100 WBC
PLATELET # BLD AUTO: 169 K/UL (ref 164–446)
PMV BLD AUTO: 11.5 FL (ref 9–12.9)
POTASSIUM SERPL-SCNC: 4 MMOL/L (ref 3.6–5.5)
PROT SERPL-MCNC: 6.4 G/DL (ref 6–8.2)
RBC # BLD AUTO: 4.06 M/UL (ref 4.2–5.4)
SIGNIFICANT IND 70042: ABNORMAL
SITE SITE: ABNORMAL
SODIUM SERPL-SCNC: 138 MMOL/L (ref 135–145)
SOURCE SOURCE: ABNORMAL
WBC # BLD AUTO: 13.2 K/UL (ref 4.8–10.8)

## 2022-04-04 PROCEDURE — 700105 HCHG RX REV CODE 258

## 2022-04-04 PROCEDURE — 99231 SBSQ HOSP IP/OBS SF/LOW 25: CPT | Performed by: INTERNAL MEDICINE

## 2022-04-04 PROCEDURE — 80053 COMPREHEN METABOLIC PANEL: CPT

## 2022-04-04 PROCEDURE — 770020 HCHG ROOM/CARE - TELE (206)

## 2022-04-04 PROCEDURE — 99233 SBSQ HOSP IP/OBS HIGH 50: CPT | Mod: GC | Performed by: INTERNAL MEDICINE

## 2022-04-04 PROCEDURE — 700111 HCHG RX REV CODE 636 W/ 250 OVERRIDE (IP)

## 2022-04-04 PROCEDURE — 85025 COMPLETE CBC W/AUTO DIFF WBC: CPT

## 2022-04-04 PROCEDURE — 36415 COLL VENOUS BLD VENIPUNCTURE: CPT

## 2022-04-04 RX ADMIN — PIPERACILLIN SODIUM AND TAZOBACTAM SODIUM 3.38 G: 3; .375 INJECTION, POWDER, FOR SOLUTION INTRAVENOUS at 06:05

## 2022-04-04 RX ADMIN — PIPERACILLIN SODIUM AND TAZOBACTAM SODIUM 3.38 G: 3; .375 INJECTION, POWDER, FOR SOLUTION INTRAVENOUS at 14:50

## 2022-04-04 RX ADMIN — ENOXAPARIN SODIUM 40 MG: 40 INJECTION SUBCUTANEOUS at 06:05

## 2022-04-04 RX ADMIN — PIPERACILLIN SODIUM AND TAZOBACTAM SODIUM 3.38 G: 3; .375 INJECTION, POWDER, FOR SOLUTION INTRAVENOUS at 21:30

## 2022-04-04 ASSESSMENT — ENCOUNTER SYMPTOMS
EYE PAIN: 0
COUGH: 0
DIARRHEA: 0
EYE DISCHARGE: 0
MYALGIAS: 1
RESPIRATORY NEGATIVE: 1
WEIGHT LOSS: 0
NECK PAIN: 1
NAUSEA: 0
CONSTIPATION: 0
PALPITATIONS: 0
DIZZINESS: 0
CHILLS: 0
DOUBLE VISION: 0
SORE THROAT: 0
COUGH: 1
WHEEZING: 0
BLURRED VISION: 0
SINUS PAIN: 0
FEVER: 0
ORTHOPNEA: 0
CONSTITUTIONAL NEGATIVE: 1
WEAKNESS: 0
PSYCHIATRIC NEGATIVE: 1
SHORTNESS OF BREATH: 0
FOCAL WEAKNESS: 0
VOMITING: 0
EYE PAIN: 1
HEADACHES: 0
ABDOMINAL PAIN: 0

## 2022-04-04 ASSESSMENT — PAIN DESCRIPTION - PAIN TYPE: TYPE: ACUTE PAIN

## 2022-04-04 NOTE — PROGRESS NOTES
"Daily Progress Note:     Date of Service: 4/4/2022  Primary Team: [facility] White Team   Attending: JUSTIN Tomas   Senior Resident: Dr. Osei  Intern: Romana Zaldivar M.D. MPH   Contact:  [phone]    ID:  Ms. Sudha Chi is a 26 y.o. 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     Subjective  -slept \"better\"  -Neck feels \"better\"  -tenderness of mediastinum area to palpation  -WBC trending down  -Afebrile overnight  -Tachycardia resolved    Consultants/Specialty:  ID    Review of Systems:    Review of Systems   Constitutional: Negative for chills, fever and weight loss.   HENT: Negative for congestion, ear pain, nosebleeds and sinus pain.    Eyes: Negative for blurred vision, double vision, pain and discharge.   Respiratory: Positive for cough. Negative for shortness of breath and wheezing.    Cardiovascular: Negative for chest pain, palpitations and leg swelling.   Gastrointestinal: Negative for abdominal pain, constipation, diarrhea, nausea and vomiting.   Genitourinary: Negative for dysuria, hematuria and urgency.   Musculoskeletal: Positive for myalgias and neck pain.   Neurological: Negative for dizziness, focal weakness, weakness and headaches.       Objective Data:   Physical Exam:   Vitals:   Temp:  [36.6 °C (97.8 °F)-37.4 °C (99.4 °F)] 36.9 °C (98.5 °F)  Pulse:  [81-98] 93  Resp:  [15-18] 16  BP: (103-141)/(59-91) 128/91  SpO2:  [94 %-99 %] 96 %    Physical Exam  Constitutional:       General: She is not in acute distress.     Appearance: She is ill-appearing. She is not diaphoretic.   HENT:      Head: Normocephalic and atraumatic.      Ears:      Comments: No visible active draining. Severe posterior auricular tenderness.      Mouth/Throat:      " Mouth: Mucous membranes are moist.      Pharynx: Oropharynx is clear. No oropharyngeal exudate or posterior oropharyngeal erythema.   Eyes:      General: No scleral icterus.        Right eye: No discharge.         Left eye: No discharge.      Extraocular Movements: Extraocular movements intact.      Conjunctiva/sclera: Conjunctivae normal.      Pupils: Pupils are equal, round, and reactive to light.   Neck:      Comments: Visibly swollen L neck with swollen and tender extensive lymphadenopathy including the postauricular and posterior cervical lymphnodes.  ROM limited due to pain and swelling - limited flexion and left sided rotation  Cardiovascular:      Rate and Rhythm: Normal rate and regular rhythm.      Pulses: Normal pulses.      Heart sounds: Normal heart sounds. No murmur heard.  Pulmonary:      Effort: Pulmonary effort is normal. No respiratory distress.      Breath sounds: Normal breath sounds.      Comments: Tenderness to palpation over the mediastinum  Chest:      Chest wall: Tenderness present.   Abdominal:      General: Abdomen is flat. Bowel sounds are normal. There is no distension.      Palpations: Abdomen is soft.      Tenderness: There is no abdominal tenderness. There is no guarding.      Comments: No hepatosplenomegally   Musculoskeletal:         General: No swelling, tenderness, deformity or signs of injury. Normal range of motion.      Right lower leg: No edema.      Left lower leg: No edema.   Skin:     General: Skin is warm.      Findings: No lesion or rash.   Neurological:      General: No focal deficit present.      Mental Status: She is alert and oriented to person, place, and time.      Cranial Nerves: No cranial nerve deficit.      Sensory: No sensory deficit.      Motor: No weakness.   Psychiatric:         Mood and Affect: Mood normal.         Behavior: Behavior normal.         Thought Content: Thought content normal.         Judgment: Judgment normal.       Labs:   Recent Labs      04/02/22  1245 04/03/22  0050 04/04/22  0050   WBC 14.0* 16.4* 13.2*   RBC 4.30 4.13* 4.06*   HEMOGLOBIN 13.0 12.4 12.1   HEMATOCRIT 39.1 38.3 37.2   MCV 90.9 92.7 91.6   MCH 30.2 30.0 29.8   RDW 45.2 46.5 44.3   PLATELETCT 171 172 169   MPV 11.0 12.0 11.5   NEUTSPOLYS 83.30* 81.70* 78.60*   LYMPHOCYTES 6.70* 8.10* 9.00*   MONOCYTES 9.00 8.40 7.90   EOSINOPHILS 0.10 0.80 3.70   BASOPHILS 0.40 0.50 0.30     Recent Labs     04/02/22  1245 04/03/22  0050 04/04/22  0050   SODIUM 134* 134* 138   POTASSIUM 3.7 3.8 4.0   CHLORIDE 102 101 105   CO2 20 21 22   GLUCOSE 104* 100* 120*   BUN 8 6* 6*     Recent Labs     04/02/22  1245 04/03/22  0050 04/04/22  0050   ALBUMIN 3.7 3.5 3.5   TBILIRUBIN 0.8 1.3 0.5   ALKPHOSPHAT 56 61 61   TOTPROTEIN 6.4 6.4 6.4   ALTSGPT 7 8 10   ASTSGOT 11* 12 12   CREATININE 0.79 0.89 0.67         Imaging:   CT-SOFT TISSUE NECK WITH   Final Result      1.  Redemonstrated is extensive inflammatory fat stranding throughout the left neck as well as extending into the superior mediastinum which is most consistent with infection. Asymmetric enlargement of the left sternocleidomastoid muscle suggesting    myositis.   2.  There is no abscess.   3.  Enlarged left cervical lymph nodes are presumably reactive.   4.  There is focal significant narrowing of the left internal jugular vein in the upper neck at the C1-C2 level however there is no expansile filling defect to suggest acute thrombosis.      CT-CTA CHEST PULMONARY ARTERY W/ RECONS   Final Result      1.  No evidence of pulmonary embolus.      2.  Trace effusions with left lower lobe atelectasis versus infiltrate.      3.  Left supraclavicular edema.            US-EXTREMITY VENOUS UPPER UNILAT LEFT   Final Result      DX-CHEST-2 VIEWS   Final Result      No acute cardiopulmonary abnormality.      MR-MRA NECK-WITH   Final Result      1.  Findings highly suspicious for occlusion of the peripheral portion of the LEFT internal jugular vein as seen on the  outside CT. This could be sterile or related to septic thrombophlebitis.   2.  Inflammatory changes in the LEFT neck with enlargement of adjacent lymph nodes likely infectious with components of cellulitis and myositis, less likely primary peritonitis.   3.  Unremarkable appearance of the internal carotid arteries   4.  If further imaging assessment is pursued, repeat contrast enhanced neck CT is recommended. Ultrasound may also be of value.      CT-FOREIGN FILM CAT SCAN   Final Result      CT-FOREIGN FILM CAT SCAN   Final Result        CT-SOFT TISSUE NECK WITH  4/3/22  1.  Redemonstrated is extensive inflammatory fat stranding throughout the left neck as well as extending into the superior mediastinum which is most consistent with infection. Asymmetric enlargement of the left sternocleidomastoid muscle suggesting   myositis.   2.  There is no abscess.   3.  Enlarged left cervical lymph nodes are presumably reactive.   4.  There is focal significant narrowing of the left internal jugular vein in the upper neck at the C1-C2 level however there is no expansile filling defect to suggest acute thrombosis.    CT-CTA CHEST PULMONARY ARTERY W/ RECONS  4/3/22  1.  No evidence of pulmonary embolus.   2.  Trace effusions with left lower lobe atelectasis versus infiltrate.   3.  Left supraclavicular edema.     Problem Representation:   Ms. Sudha Chi is a 26 y.o. 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 Patient is on zosyn (started 4/2), with improved fevers, tachycardia, and range of motion in neck.     * Lemierre syndrome  Assessment & Plan  History of pharyngitis prior to symptoms concerning for possible Lemierre's. MRA with myositis and extensive lymphadenopathy. Started on IV zosyn  4/2.  ENT consulted- signed off, ID following  -ID recs appreciated  -continue zosyn abx    -no indication for anticoagulation at this time, if continues to have fever and elevated white count after 5 days of abx may need to reassess need for AC  -f/u OSH blood cx        Sepsis (HCC)- (present on admission)  Assessment & Plan  This is Sepsis Present on admission  SIRS criteria identified on my evaluation include: Fever, with temperature greater than 101 deg F, Tachycardia, with heart rate greater than 90 BPM and Leukocytosis, with WBC greater than 12,000  Source is likely IJV thrombophlebitis  Sepsis protocol initiated  Fluid resuscitation ordered per protocol  Crystalloid Fluid Administration: s/p 2 L IVF  LA neg  Procal Neg  WBC up trending 16.4 from 14 at admission but down trended on 4/4 to 13.2  HD stable though with continued tacycardia  Ear fluid cx 4/2: NGTD  Blood culture from OSH- unknown results  Blood cx 4/3- NGTD.  Fevers and tachycardia resolved 4/4    -Admit to tele  -prn tylenol for fever  -IV zosyn for suspected Lemierre's  -CTM          Romana Zaldivar M.D. MPH

## 2022-04-04 NOTE — CARE PLAN
The patient is Stable - Low risk of patient condition declining or worsening    Shift Goals  Clinical Goals: IV antibiotics  Patient Goals: Rest, discharge    Problem: Pain - Standard  Goal: Alleviation of pain or a reduction in pain to the patient’s comfort goal  Outcome: Progressing     Problem: Knowledge Deficit - Standard  Goal: Patient and family/care givers will demonstrate understanding of plan of care, disease process/condition, diagnostic tests and medications  Outcome: Progressing

## 2022-04-04 NOTE — NON-PROVIDER
"    Daily Progress Note:     Date of Service: 4/4/2022  Primary Team: [facility] White Team   Attending: JUSTIN Tomas   Senior Resident: Dr. Osei  Intern: Dr. Zaldivar  Contact:  [phone]    Chief Complaint:   Left neck and ear pain    Subjective:   Sudha is a previously healthy 26 year old female with a pmhx of migraines tx with Sumaptriptan (none recently), a COVID infection 2 months ago (potentially hypercoagulable state?) presenting with acute onset of severe left ear and neck pain with 3/4 SIRS criteria, internal jugular vein thrombus on CT, and s/s of Lemierre syndrome.     Yesterday we ordered a CT of the chest which came back with left supraclavicular edema, trace effusions with left lower lobe atelectasis versus infiltrate and NO evidence of a PE.    The CT of the neck read extensive inflammatory fat stranding throughout the left neck as well as extending into the superior mediastinum which is most consistent with infection. Asymmetric enlargement of the left sternocleidomastoid muscle suggesting   Myositis. There is focal significant narrowing of the left internal jugular vein in the upper neck at the C1-C2 level however there is no expansile filling defect to suggest acute thrombosis.    This morning she is clinically improving, the neck pain is a 6/10 and her chest is a 1/10 when palpated. She did note that she had pain behind her left eye that is typically followed by a migraine, but the pain dissipated and no migraine occurred.     The patient also mentioned her preferred name is  \"Court\"    Consultants/Specialty:  Infectious Disease    Review of Systems:  Review of Systems   Constitutional: Negative for chills, fever and malaise/fatigue.   HENT: Negative for nosebleeds.    Eyes: Positive for pain.   Respiratory: Negative.    Cardiovascular: Positive for chest pain. Negative for palpitations and orthopnea.   Musculoskeletal: Positive for neck pain.   Skin: Negative.    Neurological: " Negative for dizziness and headaches.   Psychiatric/Behavioral: Negative.        Objective Data:   Physical Exam:   Vitals:   Temp:  [36.6 °C (97.8 °F)-37.4 °C (99.4 °F)] 36.9 °C (98.5 °F)  Pulse:  [81-98] 93  Resp:  [15-18] 16  BP: (103-141)/(59-91) 128/91  SpO2:  [94 %-99 %] 96 %    Physical Exam  Constitutional:       Appearance: Normal appearance.   HENT:      Nose: Nose normal.      Mouth/Throat:      Mouth: Mucous membranes are moist.      Pharynx: Oropharynx is clear.   Eyes:      Extraocular Movements: Extraocular movements intact.      Conjunctiva/sclera: Conjunctivae normal.      Pupils: Pupils are equal, round, and reactive to light.   Neck:      Comments: The neck is tender to palpation, but the swelling has decreased slightly  Cardiovascular:      Rate and Rhythm: Normal rate and regular rhythm.      Heart sounds: Normal heart sounds.   Pulmonary:      Effort: Pulmonary effort is normal.      Breath sounds: Normal breath sounds.   Musculoskeletal:      Cervical back: Rigidity and tenderness present.   Lymphadenopathy:      Cervical: Cervical adenopathy present.   Neurological:      General: No focal deficit present.      Mental Status: She is alert and oriented to person, place, and time.   Psychiatric:         Mood and Affect: Mood normal.         Behavior: Behavior normal.           Labs/Imaging:  Results for orders placed or performed during the hospital encounter of 04/02/22   CBC With Differential   Result Value Ref Range    WBC 14.0 (H) 4.8 - 10.8 K/uL    RBC 4.30 4.20 - 5.40 M/uL    Hemoglobin 13.0 12.0 - 16.0 g/dL    Hematocrit 39.1 37.0 - 47.0 %    MCV 90.9 81.4 - 97.8 fL    MCH 30.2 27.0 - 33.0 pg    MCHC 33.2 (L) 33.6 - 35.0 g/dL    RDW 45.2 35.9 - 50.0 fL    Platelet Count 171 164 - 446 K/uL    MPV 11.0 9.0 - 12.9 fL    Neutrophils-Polys 83.30 (H) 44.00 - 72.00 %    Lymphocytes 6.70 (L) 22.00 - 41.00 %    Monocytes 9.00 0.00 - 13.40 %    Eosinophils 0.10 0.00 - 6.90 %    Basophils 0.40 0.00  - 1.80 %    Immature Granulocytes 0.50 0.00 - 0.90 %    Nucleated RBC 0.00 /100 WBC    Neutrophils (Absolute) 11.68 (H) 2.00 - 7.15 K/uL    Lymphs (Absolute) 0.94 (L) 1.00 - 4.80 K/uL    Monos (Absolute) 1.26 (H) 0.00 - 0.85 K/uL    Eos (Absolute) 0.01 0.00 - 0.51 K/uL    Baso (Absolute) 0.06 0.00 - 0.12 K/uL    Immature Granulocytes (abs) 0.07 0.00 - 0.11 K/uL    NRBC (Absolute) 0.00 K/uL   Comp Metabolic Panel   Result Value Ref Range    Sodium 134 (L) 135 - 145 mmol/L    Potassium 3.7 3.6 - 5.5 mmol/L    Chloride 102 96 - 112 mmol/L    Co2 20 20 - 33 mmol/L    Anion Gap 12.0 7.0 - 16.0    Glucose 104 (H) 65 - 99 mg/dL    Bun 8 8 - 22 mg/dL    Creatinine 0.79 0.50 - 1.40 mg/dL    Calcium 8.1 (L) 8.5 - 10.5 mg/dL    AST(SGOT) 11 (L) 12 - 45 U/L    ALT(SGPT) 7 2 - 50 U/L    Alkaline Phosphatase 56 30 - 99 U/L    Total Bilirubin 0.8 0.1 - 1.5 mg/dL    Albumin 3.7 3.2 - 4.9 g/dL    Total Protein 6.4 6.0 - 8.2 g/dL    Globulin 2.7 1.9 - 3.5 g/dL    A-G Ratio 1.4 g/dL   Lactic Acid   Result Value Ref Range    Lactic Acid 1.0 0.5 - 2.0 mmol/L   Lactic Acid   Result Value Ref Range    Lactic Acid 0.9 0.5 - 2.0 mmol/L   Blood Culture    Specimen: Peripheral; Blood   Result Value Ref Range    Significant Indicator NEG     Source BLD     Site PERIPHERAL     Culture Result       No Growth  Note: Blood cultures are incubated for 5 days and  are monitored continuously.Positive blood cultures  are called to the RN and reported as soon as  they are identified.     Blood Culture    Specimen: Peripheral; Blood   Result Value Ref Range    Significant Indicator NEG     Source BLD     Site PERIPHERAL     Culture Result       No Growth  Note: Blood cultures are incubated for 5 days and  are monitored continuously.Positive blood cultures  are called to the RN and reported as soon as  they are identified.     PTT   Result Value Ref Range    APTT 31.1 24.7 - 36.0 sec   Prothrombin Time   Result Value Ref Range    PT 15.2 (H) 12.0 - 14.6  sec    INR 1.24 (H) 0.87 - 1.13   CULTURE WOUND W/ GRAM STAIN    Specimen: Exudate; Wound   Result Value Ref Range    Significant Indicator POS (POS)     Source WND     Site EXUDATE; left ear     Culture Result Heavy growth usual skin sandy. (A)     Gram Stain Result       Moderate Gram positive cocci.  Many Gram positive rods.      Culture Result Staphylococcus aureus  Heavy growth   (A)        Susceptibility    Staphylococcus aureus - LAURIE     Azithromycin <=2 Sensitive mcg/mL     Clindamycin <=0.25 Sensitive mcg/mL     Cefazolin <=8 Sensitive mcg/mL     Cefepime <=4 Sensitive mcg/mL     Ceftaroline <=0.5 Sensitive mcg/mL     Daptomycin 1 Sensitive mcg/mL     Ampicillin/sulbactam <=8/4 Sensitive mcg/mL     Erythromycin <=0.25 Sensitive mcg/mL     Vancomycin 2 Sensitive mcg/mL     Oxacillin 1 Sensitive mcg/mL     Pip/Tazobactam <=8 Sensitive mcg/mL     Trimeth/Sulfa <=0.5/9.5 Sensitive mcg/mL     Tetracycline <=4 Sensitive mcg/mL   ESTIMATED GFR   Result Value Ref Range    GFR (CKD-EPI) 106 >60 mL/min/1.73 m 2   COV-2, FLU A/B, AND RSV BY PCR (2-4 HOURS CEPHEID): Collect NP swab in VTM    Specimen: Nasopharyngeal; Respirate   Result Value Ref Range    Influenza virus A RNA Negative Negative    Influenza virus B, PCR Negative Negative    RSV, PCR Negative Negative    SARS-CoV-2 by PCR NotDetected     SARS-CoV-2 Source NP Swab    MONONUCLEOSIS TEST QUAL   Result Value Ref Range    Heterophile Screen Negative Negative   GRAM STAIN    Specimen: Wound   Result Value Ref Range    Significant Indicator .     Source WND     Site EXUDATE; left ear     Gram Stain Result       Moderate Gram positive cocci.  Many Gram positive rods.     CBC with Differential   Result Value Ref Range    WBC 16.4 (H) 4.8 - 10.8 K/uL    RBC 4.13 (L) 4.20 - 5.40 M/uL    Hemoglobin 12.4 12.0 - 16.0 g/dL    Hematocrit 38.3 37.0 - 47.0 %    MCV 92.7 81.4 - 97.8 fL    MCH 30.0 27.0 - 33.0 pg    MCHC 32.4 (L) 33.6 - 35.0 g/dL    RDW 46.5 35.9 - 50.0 fL     Platelet Count 172 164 - 446 K/uL    MPV 12.0 9.0 - 12.9 fL    Neutrophils-Polys 81.70 (H) 44.00 - 72.00 %    Lymphocytes 8.10 (L) 22.00 - 41.00 %    Monocytes 8.40 0.00 - 13.40 %    Eosinophils 0.80 0.00 - 6.90 %    Basophils 0.50 0.00 - 1.80 %    Immature Granulocytes 0.50 0.00 - 0.90 %    Nucleated RBC 0.00 /100 WBC    Neutrophils (Absolute) 13.36 (H) 2.00 - 7.15 K/uL    Lymphs (Absolute) 1.33 1.00 - 4.80 K/uL    Monos (Absolute) 1.38 (H) 0.00 - 0.85 K/uL    Eos (Absolute) 0.13 0.00 - 0.51 K/uL    Baso (Absolute) 0.08 0.00 - 0.12 K/uL    Immature Granulocytes (abs) 0.08 0.00 - 0.11 K/uL    NRBC (Absolute) 0.00 K/uL   Comp Metabolic Panel (CMP)   Result Value Ref Range    Sodium 134 (L) 135 - 145 mmol/L    Potassium 3.8 3.6 - 5.5 mmol/L    Chloride 101 96 - 112 mmol/L    Co2 21 20 - 33 mmol/L    Anion Gap 12.0 7.0 - 16.0    Glucose 100 (H) 65 - 99 mg/dL    Bun 6 (L) 8 - 22 mg/dL    Creatinine 0.89 0.50 - 1.40 mg/dL    Calcium 8.2 (L) 8.5 - 10.5 mg/dL    AST(SGOT) 12 12 - 45 U/L    ALT(SGPT) 8 2 - 50 U/L    Alkaline Phosphatase 61 30 - 99 U/L    Total Bilirubin 1.3 0.1 - 1.5 mg/dL    Albumin 3.5 3.2 - 4.9 g/dL    Total Protein 6.4 6.0 - 8.2 g/dL    Globulin 2.9 1.9 - 3.5 g/dL    A-G Ratio 1.2 g/dL   ESTIMATED GFR   Result Value Ref Range    GFR (CKD-EPI) 92 >60 mL/min/1.73 m 2   HCG QUAL SERUM   Result Value Ref Range    Beta-Hcg Qualitative Serum Negative Negative   CBC WITH DIFFERENTIAL   Result Value Ref Range    WBC 13.2 (H) 4.8 - 10.8 K/uL    RBC 4.06 (L) 4.20 - 5.40 M/uL    Hemoglobin 12.1 12.0 - 16.0 g/dL    Hematocrit 37.2 37.0 - 47.0 %    MCV 91.6 81.4 - 97.8 fL    MCH 29.8 27.0 - 33.0 pg    MCHC 32.5 (L) 33.6 - 35.0 g/dL    RDW 44.3 35.9 - 50.0 fL    Platelet Count 169 164 - 446 K/uL    MPV 11.5 9.0 - 12.9 fL    Neutrophils-Polys 78.60 (H) 44.00 - 72.00 %    Lymphocytes 9.00 (L) 22.00 - 41.00 %    Monocytes 7.90 0.00 - 13.40 %    Eosinophils 3.70 0.00 - 6.90 %    Basophils 0.30 0.00 - 1.80 %     Immature Granulocytes 0.50 0.00 - 0.90 %    Nucleated RBC 0.00 /100 WBC    Neutrophils (Absolute) 10.34 (H) 2.00 - 7.15 K/uL    Lymphs (Absolute) 1.18 1.00 - 4.80 K/uL    Monos (Absolute) 1.04 (H) 0.00 - 0.85 K/uL    Eos (Absolute) 0.48 0.00 - 0.51 K/uL    Baso (Absolute) 0.04 0.00 - 0.12 K/uL    Immature Granulocytes (abs) 0.07 0.00 - 0.11 K/uL    NRBC (Absolute) 0.00 K/uL   Comp Metabolic Panel   Result Value Ref Range    Sodium 138 135 - 145 mmol/L    Potassium 4.0 3.6 - 5.5 mmol/L    Chloride 105 96 - 112 mmol/L    Co2 22 20 - 33 mmol/L    Anion Gap 11.0 7.0 - 16.0    Glucose 120 (H) 65 - 99 mg/dL    Bun 6 (L) 8 - 22 mg/dL    Creatinine 0.67 0.50 - 1.40 mg/dL    Calcium 8.3 (L) 8.5 - 10.5 mg/dL    AST(SGOT) 12 12 - 45 U/L    ALT(SGPT) 10 2 - 50 U/L    Alkaline Phosphatase 61 30 - 99 U/L    Total Bilirubin 0.5 0.1 - 1.5 mg/dL    Albumin 3.5 3.2 - 4.9 g/dL    Total Protein 6.4 6.0 - 8.2 g/dL    Globulin 2.9 1.9 - 3.5 g/dL    A-G Ratio 1.2 g/dL   ESTIMATED GFR   Result Value Ref Range    GFR (CKD-EPI) 124 >60 mL/min/1.73 m 2       Problem Representation:  Tahira is a previously healthy 26 year old female with a pmhx of migraines tx with Sumaptriptan (none recently), presenting with acute onset of severe left ear and neck pain with 3/4 SIRS criteria, internal jugular vein thrombus on CT, and s/s of Lemierre syndrome.      Sepsis  #SIRS 3/4 with leukocytosis (>12,000), tachycardia (>90BPM), and fever (>101F)  The patient originally presented to [facility] for symptoms, where she was treated with Rocephin after a blood culture was obtained.      · 4/3/22: WBC trending up from 14 to 16.4, pro calcitonin and lactic acid negative, continued tachycardia and fever. Fluid resuscitation ordered, 2L IVF  · 4/4/22: WBC continue to trend down, afebrile overnight (last dose of Tylenol at 1454 4/3/22), and tachycardia resolved over the last 24 hours.     Pending blood culture results from OSH, and [facility]  Continue Tylenol  prn, and IV Zosyn. Based on cultures, consider d/c with IV abx outpatient      Lemierre Syndrome  Tahira had pharyngitis 10 days prior to the onset of symptoms, that lasted for 4 days with no other symptoms, and resolved spontaneously. This along with current sequela, are concerning for Lemierre's (Internal jugular vein thrombus).     · 4/3/22: Pain has extended into the anterior mediastinum. Case reports have shown complications of pulmonary involvement, septic emboli, pulmonary infiltrate, pneumothorax, or necrotizing mediastinitis (Uptodate). Due to the severity of these complications, a CT of the chest and neck with contrast will be ordered.   · 4/4/22: Imaging demonstrated extending infection into the superior mediastinum, as well as either a left lower lobe atelectases vs infiltrate. Findings are less concerning for an ongoing thrombus. Results from the ear culture demonstrated S. Aureus, and blood is pending.         Infectious disease consulted, and recommended continuation of IV Zosyn, follow up on blood cultures,revaluate indication for anticoagulation if fever continues and elevated white count after 5 days of appropriate antibiotics.     Note Author  Patricia Zayas, MS3 at the [facility] [employer]

## 2022-04-04 NOTE — CARE PLAN
The patient is Stable - Low risk of patient condition declining or worsening    Shift Goals  Clinical Goals: Safety  Patient Goals: Rest, discharge    Progress made toward(s) clinical / shift goals:      Problem: Pain - Standard  Goal: Alleviation of pain or a reduction in pain to the patient’s comfort goal  Outcome: Progressing  Flowsheets (Taken 4/3/2022 2005)  Pain Rating Scale (NPRS): 3  Note: Provided heat pack, extra pillows and blankets. Will continue to assess and treat accordingly.     Problem: Knowledge Deficit - Standard  Goal: Patient and family/care givers will demonstrate understanding of plan of care, disease process/condition, diagnostic tests and medications  Outcome: Progressing  Note: Educated patient on POC, pain management, medication administration, ambulation, safety, diet, rest, interventions in place to maintain/ improve skin integrity, IV antibiotics, discharge planning, labs. Will continue to educate patient on POC accordingly.

## 2022-04-05 LAB
ALBUMIN SERPL BCP-MCNC: 3.7 G/DL (ref 3.2–4.9)
ALBUMIN/GLOB SERPL: 1.2 G/DL
ALP SERPL-CCNC: 53 U/L (ref 30–99)
ALT SERPL-CCNC: 12 U/L (ref 2–50)
ANION GAP SERPL CALC-SCNC: 10 MMOL/L (ref 7–16)
AST SERPL-CCNC: 12 U/L (ref 12–45)
BASOPHILS # BLD AUTO: 0.5 % (ref 0–1.8)
BASOPHILS # BLD: 0.04 K/UL (ref 0–0.12)
BILIRUB SERPL-MCNC: 0.3 MG/DL (ref 0.1–1.5)
BUN SERPL-MCNC: 9 MG/DL (ref 8–22)
CALCIUM SERPL-MCNC: 8.7 MG/DL (ref 8.5–10.5)
CHLORIDE SERPL-SCNC: 105 MMOL/L (ref 96–112)
CO2 SERPL-SCNC: 22 MMOL/L (ref 20–33)
CREAT SERPL-MCNC: 0.65 MG/DL (ref 0.5–1.4)
EOSINOPHIL # BLD AUTO: 0.41 K/UL (ref 0–0.51)
EOSINOPHIL NFR BLD: 4.9 % (ref 0–6.9)
ERYTHROCYTE [DISTWIDTH] IN BLOOD BY AUTOMATED COUNT: 44.7 FL (ref 35.9–50)
GFR SERPLBLD CREATININE-BSD FMLA CKD-EPI: 125 ML/MIN/1.73 M 2
GLOBULIN SER CALC-MCNC: 3 G/DL (ref 1.9–3.5)
GLUCOSE SERPL-MCNC: 109 MG/DL (ref 65–99)
HCT VFR BLD AUTO: 37.9 % (ref 37–47)
HGB BLD-MCNC: 12.2 G/DL (ref 12–16)
IMM GRANULOCYTES # BLD AUTO: 0.05 K/UL (ref 0–0.11)
IMM GRANULOCYTES NFR BLD AUTO: 0.6 % (ref 0–0.9)
LYMPHOCYTES # BLD AUTO: 1.73 K/UL (ref 1–4.8)
LYMPHOCYTES NFR BLD: 20.6 % (ref 22–41)
MCH RBC QN AUTO: 29.9 PG (ref 27–33)
MCHC RBC AUTO-ENTMCNC: 32.2 G/DL (ref 33.6–35)
MCV RBC AUTO: 92.9 FL (ref 81.4–97.8)
MONOCYTES # BLD AUTO: 0.8 K/UL (ref 0–0.85)
MONOCYTES NFR BLD AUTO: 9.5 % (ref 0–13.4)
NEUTROPHILS # BLD AUTO: 5.38 K/UL (ref 2–7.15)
NEUTROPHILS NFR BLD: 63.9 % (ref 44–72)
NRBC # BLD AUTO: 0 K/UL
NRBC BLD-RTO: 0 /100 WBC
PLATELET # BLD AUTO: 209 K/UL (ref 164–446)
PMV BLD AUTO: 11.6 FL (ref 9–12.9)
POTASSIUM SERPL-SCNC: 4.1 MMOL/L (ref 3.6–5.5)
PROT SERPL-MCNC: 6.7 G/DL (ref 6–8.2)
RBC # BLD AUTO: 4.08 M/UL (ref 4.2–5.4)
SODIUM SERPL-SCNC: 137 MMOL/L (ref 135–145)
WBC # BLD AUTO: 8.4 K/UL (ref 4.8–10.8)

## 2022-04-05 PROCEDURE — 36415 COLL VENOUS BLD VENIPUNCTURE: CPT

## 2022-04-05 PROCEDURE — A9270 NON-COVERED ITEM OR SERVICE: HCPCS | Performed by: STUDENT IN AN ORGANIZED HEALTH CARE EDUCATION/TRAINING PROGRAM

## 2022-04-05 PROCEDURE — 700102 HCHG RX REV CODE 250 W/ 637 OVERRIDE(OP): Performed by: STUDENT IN AN ORGANIZED HEALTH CARE EDUCATION/TRAINING PROGRAM

## 2022-04-05 PROCEDURE — 80053 COMPREHEN METABOLIC PANEL: CPT

## 2022-04-05 PROCEDURE — 85025 COMPLETE CBC W/AUTO DIFF WBC: CPT

## 2022-04-05 PROCEDURE — 700105 HCHG RX REV CODE 258

## 2022-04-05 PROCEDURE — 700111 HCHG RX REV CODE 636 W/ 250 OVERRIDE (IP)

## 2022-04-05 PROCEDURE — 99233 SBSQ HOSP IP/OBS HIGH 50: CPT | Mod: GC | Performed by: INTERNAL MEDICINE

## 2022-04-05 PROCEDURE — 770020 HCHG ROOM/CARE - TELE (206)

## 2022-04-05 RX ADMIN — ENOXAPARIN SODIUM 40 MG: 40 INJECTION SUBCUTANEOUS at 05:36

## 2022-04-05 RX ADMIN — PIPERACILLIN SODIUM AND TAZOBACTAM SODIUM 3.38 G: 3; .375 INJECTION, POWDER, FOR SOLUTION INTRAVENOUS at 14:40

## 2022-04-05 RX ADMIN — PIPERACILLIN SODIUM AND TAZOBACTAM SODIUM 3.38 G: 3; .375 INJECTION, POWDER, FOR SOLUTION INTRAVENOUS at 21:08

## 2022-04-05 RX ADMIN — PIPERACILLIN SODIUM AND TAZOBACTAM SODIUM 3.38 G: 3; .375 INJECTION, POWDER, FOR SOLUTION INTRAVENOUS at 05:35

## 2022-04-05 RX ADMIN — CYCLOBENZAPRINE 10 MG: 10 TABLET, FILM COATED ORAL at 21:19

## 2022-04-05 ASSESSMENT — FIBROSIS 4 INDEX: FIB4 SCORE: 0.41

## 2022-04-05 ASSESSMENT — ENCOUNTER SYMPTOMS
FEVER: 0
ORTHOPNEA: 0
NECK PAIN: 1
PALPITATIONS: 0
EYE DISCHARGE: 0
CHILLS: 0
CONSTIPATION: 0
WEIGHT LOSS: 0
COUGH: 1
EYE PAIN: 1
VOMITING: 0
NAUSEA: 0
HEADACHES: 0
EYE PAIN: 0
BLURRED VISION: 0
WEAKNESS: 0
FOCAL WEAKNESS: 0
DOUBLE VISION: 0
DIARRHEA: 1
DIZZINESS: 0
MYALGIAS: 1
PSYCHIATRIC NEGATIVE: 1
SINUS PAIN: 0
SHORTNESS OF BREATH: 0
ABDOMINAL PAIN: 0
RESPIRATORY NEGATIVE: 1
WHEEZING: 0

## 2022-04-05 ASSESSMENT — PAIN DESCRIPTION - PAIN TYPE: TYPE: ACUTE PAIN

## 2022-04-05 NOTE — CARE PLAN
The patient is Stable - Low risk of patient condition declining or worsening    Shift Goals  Clinical Goals: Safety  Patient Goals: Rest, discharge    Progress made toward(s) clinical / shift goals:      Problem: Pain - Standard  Goal: Alleviation of pain or a reduction in pain to the patient’s comfort goal  Outcome: Progressing  Flowsheets (Taken 4/4/2022 1945)  Pain Rating Scale (NPRS): 0  Note: Declines at this time. Will continue to assess and treat accordingly.     Problem: Knowledge Deficit - Standard  Goal: Patient and family/care givers will demonstrate understanding of plan of care, disease process/condition, diagnostic tests and medications  Outcome: Progressing  Note: Educated patient on POC, pain management, medication administration, ambulation, safety, diet, rest, IV antibiotics, monitoring output, discharge planning. Will continue to assess and treat accordingly.

## 2022-04-05 NOTE — PROGRESS NOTES
Daily Progress Note:     Date of Service: 4/5/2022  Primary Team: [employer] [facility] White Team   Attending: JUSTIN Tomas   Senior Resident: Dr. Osei  Intern: Romana Zaldivar M.D. MPH   Contact:  [phone]    ID: Ms. Sudha Chi is a 26 y.o. 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 Patient continues to improve with antibiotic therapy.    Subjective  -afebrile overnight  -WBC normalized  -Outside hospital cultures and inpatient cultures with no growth to date  -No acute events overnight  -Patient reports neck pain as 1/10 at rest and 5/10 with movement    Consultants/Specialty:  ID    Review of Systems:    Review of Systems   Constitutional: Negative for chills, fever and weight loss.   HENT: Negative for congestion, ear pain, nosebleeds and sinus pain.         Denies any swallowing difficulties   Eyes: Negative for blurred vision, double vision, pain and discharge.   Respiratory: Positive for cough. Negative for shortness of breath and wheezing.    Cardiovascular: Negative for chest pain, palpitations and leg swelling.   Gastrointestinal: Positive for diarrhea (reports loose stools). Negative for abdominal pain, constipation, nausea and vomiting.   Genitourinary: Negative for dysuria, hematuria and urgency.   Musculoskeletal: Positive for myalgias and neck pain.   Neurological: Negative for dizziness, focal weakness, weakness and headaches.       Objective Data:   Physical Exam:   Vitals:   Temp:  [36.1 °C (97 °F)-36.9 °C (98.4 °F)] 36.7 °C (98 °F)  Pulse:  [72-88] 85  Resp:  [16] 16  BP: ()/(62-93) 119/80  SpO2:  [97 %-98 %] 98 %    Physical Exam  Constitutional:       General: She is not in acute distress.     Appearance: She is not ill-appearing or  diaphoretic.   HENT:      Head: Normocephalic and atraumatic.      Ears:      Comments: No visible active draining. Severe posterior auricular tenderness.      Nose: No congestion or rhinorrhea.      Mouth/Throat:      Mouth: Mucous membranes are moist.      Pharynx: Oropharynx is clear. No oropharyngeal exudate or posterior oropharyngeal erythema.   Eyes:      General: No scleral icterus.        Right eye: No discharge.         Left eye: No discharge.      Extraocular Movements: Extraocular movements intact.      Conjunctiva/sclera: Conjunctivae normal.      Pupils: Pupils are equal, round, and reactive to light.   Neck:      Comments: Visibly swollen Left neck. Tenderness with palpation.   ROM limited due to pain and swelling - limited flexion and left sided rotation. Lower left neck swelling improved  Cardiovascular:      Rate and Rhythm: Normal rate and regular rhythm.      Pulses: Normal pulses.      Heart sounds: Normal heart sounds. No murmur heard.  Pulmonary:      Effort: Pulmonary effort is normal. No respiratory distress.      Breath sounds: Normal breath sounds.      Comments: Tenderness to palpation over the mediastinum  Chest:      Chest wall: Tenderness present.   Abdominal:      General: Abdomen is flat. Bowel sounds are normal. There is no distension.      Palpations: Abdomen is soft.      Tenderness: There is no abdominal tenderness. There is no guarding.      Comments: No hepatosplenomegally   Musculoskeletal:         General: No swelling, deformity or signs of injury. Normal range of motion.      Cervical back: Rigidity and tenderness present.      Right lower leg: No edema.      Left lower leg: No edema.   Skin:     General: Skin is warm and dry.      Findings: No lesion or rash.   Neurological:      General: No focal deficit present.      Mental Status: She is alert and oriented to person, place, and time.      Cranial Nerves: No cranial nerve deficit.      Sensory: No sensory deficit.       Motor: No weakness.   Psychiatric:         Mood and Affect: Mood normal.         Behavior: Behavior normal.         Thought Content: Thought content normal.         Judgment: Judgment normal.       Labs:   Recent Labs     04/03/22 0050 04/04/22 0050 04/05/22 0229   WBC 16.4* 13.2* 8.4   RBC 4.13* 4.06* 4.08*   HEMOGLOBIN 12.4 12.1 12.2   HEMATOCRIT 38.3 37.2 37.9   MCV 92.7 91.6 92.9   MCH 30.0 29.8 29.9   RDW 46.5 44.3 44.7   PLATELETCT 172 169 209   MPV 12.0 11.5 11.6   NEUTSPOLYS 81.70* 78.60* 63.90   LYMPHOCYTES 8.10* 9.00* 20.60*   MONOCYTES 8.40 7.90 9.50   EOSINOPHILS 0.80 3.70 4.90   BASOPHILS 0.50 0.30 0.50     Recent Labs     04/03/22 0050 04/04/22 0050 04/05/22 0229   SODIUM 134* 138 137   POTASSIUM 3.8 4.0 4.1   CHLORIDE 101 105 105   CO2 21 22 22   GLUCOSE 100* 120* 109*   BUN 6* 6* 9     Recent Labs     04/03/22 0050 04/04/22 0050 04/05/22 0229   ALBUMIN 3.5 3.5 3.7   TBILIRUBIN 1.3 0.5 0.3   ALKPHOSPHAT 61 61 53   TOTPROTEIN 6.4 6.4 6.7   ALTSGPT 8 10 12   ASTSGOT 12 12 12   CREATININE 0.89 0.67 0.65     Imaging:   CT-SOFT TISSUE NECK WITH   Final Result      1.  Redemonstrated is extensive inflammatory fat stranding throughout the left neck as well as extending into the superior mediastinum which is most consistent with infection. Asymmetric enlargement of the left sternocleidomastoid muscle suggesting    myositis.   2.  There is no abscess.   3.  Enlarged left cervical lymph nodes are presumably reactive.   4.  There is focal significant narrowing of the left internal jugular vein in the upper neck at the C1-C2 level however there is no expansile filling defect to suggest acute thrombosis.      CT-CTA CHEST PULMONARY ARTERY W/ RECONS   Final Result      1.  No evidence of pulmonary embolus.      2.  Trace effusions with left lower lobe atelectasis versus infiltrate.      3.  Left supraclavicular edema.            US-EXTREMITY VENOUS UPPER UNILAT LEFT   Final Result      DX-CHEST-2 VIEWS    Final Result      No acute cardiopulmonary abnormality.      MR-MRA NECK-WITH   Final Result      1.  Findings highly suspicious for occlusion of the peripheral portion of the LEFT internal jugular vein as seen on the outside CT. This could be sterile or related to septic thrombophlebitis.   2.  Inflammatory changes in the LEFT neck with enlargement of adjacent lymph nodes likely infectious with components of cellulitis and myositis, less likely primary peritonitis.   3.  Unremarkable appearance of the internal carotid arteries   4.  If further imaging assessment is pursued, repeat contrast enhanced neck CT is recommended. Ultrasound may also be of value.      CT-FOREIGN FILM CAT SCAN   Final Result      CT-FOREIGN FILM CAT SCAN   Final Result        CT-SOFT TISSUE NECK WITH  4/3/22  1.  Redemonstrated is extensive inflammatory fat stranding throughout the left neck as well as extending into the superior mediastinum which is most consistent with infection. Asymmetric enlargement of the left sternocleidomastoid muscle suggesting   myositis.   2.  There is no abscess.   3.  Enlarged left cervical lymph nodes are presumably reactive.   4.  There is focal significant narrowing of the left internal jugular vein in the upper neck at the C1-C2 level however there is no expansile filling defect to suggest acute thrombosis.    CT-CTA CHEST PULMONARY ARTERY W/ RECONS  4/3/22  1.  No evidence of pulmonary embolus.   2.  Trace effusions with left lower lobe atelectasis versus infiltrate.   3.  Left supraclavicular edema.     Problem Representation:   Ms. Sudha Chi is a 26 y.o. 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 Patient is on zosyn (started 4/2), with improved fevers, tachycardia, and  range of motion in neck. Cultures still pending, no growth to date.    * Lemierre syndrome  Assessment & Plan  History of pharyngitis prior to presenting symptoms concerning for possible Lemierre's. MRA revealed inflammation and extensive lymphadenopathy. Started on IV zosyn 4/2. ENT consulted- signed off, ID continues to follow.  -ID recs appreciated  -continue zosyn abx    -no indication for anticoagulation at this time, will reassess depending on symptoms  -f/u OSH blood cx        Sepsis (HCC)- (present on admission)  Assessment & Plan  This is Sepsis Present on admission  SIRS criteria identified on my evaluation include: Fever, with temperature greater than 101 deg F, Tachycardia, with heart rate greater than 90 BPM and Leukocytosis, with WBC greater than 12,000  Source is likely IJV thrombophlebitis  Sepsis protocol initiated  Fluid resuscitation ordered per protocol  Crystalloid Fluid Administration: s/p 2 L IVF  LA neg  Procal Neg  WBC up trending 16.4 from 14 at admission but down trended on 4/4 to 13.2 and normalized on 4/5.   HD stable though with continued tacycardia  Ear fluid cx 4/2: NGTD  Blood culture from OSH- unknown results  Blood cx 4/3- NGTD.  Fevers and tachycardia resolved 4/4    -Admit to tele  -prn tylenol for fever  -IV zosyn for suspected Lemierre's  -CTDAVID Zaldivar M.D. MPH

## 2022-04-05 NOTE — NON-PROVIDER
"    Daily Progress Note:     Date of Service: 4/5/2022  Primary Team: [employer] [facility] White Team   Attending: JUSTIN Tomas   Senior Resident: Dr. Osei  Intern: Dr. Zaldivar  Contact:  [phone]    Chief Complaint:   Left neck and ear pain    Subjective:   Sudha is a previously healthy 26 year old female with a pmhx of migraines tx with Sumaptriptan (none recently), a COVID infection 2 months ago presenting with acute onset of severe left ear and neck pain with 3/4 SIRS criteria, internal jugular vein thrombus on CT, and s/s of Lemierre syndrome.     The patient is doing better today, and is able to rotate her neck more than previously, but indicates some stiffness. Her chest is  1/10 to palpation, and her neck is . There is a new 'golf ball' sized, immobile mass on her left neck, most likely cervical lymphadenopathy.     Overnight, she experienced a loose stool, but denies fever, chills, body aches, head aches    The patient also mentioned her preferred name is  \"Court\"    Consultants/Specialty:  Infectious Disease    Review of Systems:  Review of Systems   Constitutional: Negative for chills, fever and malaise/fatigue.   HENT: Negative for nosebleeds.    Eyes: Positive for pain.   Respiratory: Negative.    Cardiovascular: Positive for chest pain. Negative for palpitations and orthopnea.   Musculoskeletal: Positive for neck pain.   Skin: Negative.    Neurological: Negative for dizziness and headaches.   Psychiatric/Behavioral: Negative.        Objective Data:   Physical Exam:   Vitals:   Temp:  [36.1 °C (97 °F)-36.9 °C (98.5 °F)] 36.1 °C (97 °F)  Pulse:  [75-93] 75  Resp:  [15-16] 16  BP: (110-137)/(72-93) 110/72  SpO2:  [95 %-98 %] 98 %    Physical Exam  Constitutional:       Appearance: Normal appearance.   HENT:      Nose: Nose normal.      Mouth/Throat:      Mouth: Mucous membranes are moist.      Pharynx: Oropharynx is clear.   Eyes:      Extraocular Movements: Extraocular " movements intact.      Conjunctiva/sclera: Conjunctivae normal.      Pupils: Pupils are equal, round, and reactive to light.   Neck:      Comments: The neck is tender to palpation, but the swelling has decreased slightly  Cardiovascular:      Rate and Rhythm: Normal rate and regular rhythm.      Heart sounds: Normal heart sounds.   Pulmonary:      Effort: Pulmonary effort is normal.      Breath sounds: Normal breath sounds.   Musculoskeletal:      Cervical back: Rigidity and tenderness present.   Lymphadenopathy:      Cervical: Cervical adenopathy present.      Left cervical: Superficial cervical adenopathy present.   Neurological:      General: No focal deficit present.      Mental Status: She is alert and oriented to person, place, and time.   Psychiatric:         Mood and Affect: Mood normal.         Behavior: Behavior normal.       Labs/Imaging:  Recent Labs     04/03/22 0050 04/04/22 0050 04/05/22 0229   WBC 16.4* 13.2* 8.4   RBC 4.13* 4.06* 4.08*   HEMOGLOBIN 12.4 12.1 12.2   HEMATOCRIT 38.3 37.2 37.9   MCV 92.7 91.6 92.9   MCH 30.0 29.8 29.9   RDW 46.5 44.3 44.7   PLATELETCT 172 169 209   MPV 12.0 11.5 11.6   NEUTSPOLYS 81.70* 78.60* 63.90   LYMPHOCYTES 8.10* 9.00* 20.60*   MONOCYTES 8.40 7.90 9.50   EOSINOPHILS 0.80 3.70 4.90   BASOPHILS 0.50 0.30 0.50     Recent Labs     04/03/22 0050 04/04/22 0050 04/05/22 0229   SODIUM 134* 138 137   POTASSIUM 3.8 4.0 4.1   CHLORIDE 101 105 105   CO2 21 22 22   GLUCOSE 100* 120* 109*   BUN 6* 6* 9     Recent Labs     04/03/22 0050 04/04/22 0050 04/05/22 0229   ALBUMIN 3.5 3.5 3.7   TBILIRUBIN 1.3 0.5 0.3   ALKPHOSPHAT 61 61 53   TOTPROTEIN 6.4 6.4 6.7   ALTSGPT 8 10 12   ASTSGOT 12 12 12   CREATININE 0.89 0.67 0.65     .    Imaging     4/5/22 - CT of the chest which came back with left supraclavicular edema, trace effusions with left lower lobe atelectasis versus infiltrate and no evidence of a PE.    4/5/22 - He CT of the neck read extensive inflammatory fat  stranding throughout the left neck as well as extending into the superior mediastinum which is most consistent with infection. Asymmetric enlargement of the left sternocleidomastoid muscle suggesting   Myositis. There is focal significant narrowing of the left internal jugular vein in the upper neck at the C1-C2 level however there is no expansile filling defect to suggest acute thrombosis.    Problem Representation:  Tahira is a previously healthy 26 year old female, (HD 4) with a pmhx of migraines tx with Sumaptriptan (none recently), presenting with acute onset of severe left ear and neck pain with 3/4 SIRS criteria, internal jugular vein thrombus on CT, and s/s of Lemierre syndrome.      Sepsis   #SIRS 3/4 with leukocytosis (>12,000), tachycardia (>90BPM), and fever (>101F)  The patient originally presented to [facility] for symptoms, where she was treated with Rocephin after a blood culture was obtained.      · 4/3/22: WBC trending up from 14 to 16.4, pro calcitonin and lactic acid negative, continued tachycardia and fever. Fluid resuscitation ordered, 2L IVF  · 4/4/22: WBC continue to trend down, afebrile overnight (last dose of Tylenol at 1454 4/3/22), and tachycardia resolved over the last 24 hours.  · [facility] Lisa noted that the blood cultures at 48 hours showed no sign's of growth. Her direct line to call back at the 5 day point is [phone]  · 4/5/22: Leukocytosis resolved (8.4), nonfebrile, and no longer tachycardic (72 to 85)  · [facility] Med Tech Sonia stated that blood cultures currently show no growth (12:42 pm)     Pending blood culture results from OSH, and [facility]  Continue Tylenol prn, and IV Zosyn. Based on cultures, consider d/c with IV abx outpatient      Lemierre Syndrome  Tahira had pharyngitis 10 days prior to the onset of symptoms, that lasted for 4 days with no other symptoms, and resolved spontaneously. This along with current sequela, are concerning for  Lemierre's (Internal jugular vein thrombus).     · 4/3/22: Pain has extended into the anterior mediastinum. Case reports have shown complications of pulmonary involvement, septic emboli, pulmonary infiltrate, pneumothorax, or necrotizing mediastinitis (Uptodate). Due to the severity of these complications, a CT of the chest and neck with contrast will be ordered.   · 4/4/22: Imaging demonstrated extending infection into the superior mediastinum, as well as either a left lower lobe atelectases vs infiltrate. Findings are less concerning for an ongoing thrombus. Results from the ear culture demonstrated S. Aureus, and blood is pending.   · 4/5/22: Swelling has reduced, and now a firm mass is easily palpable on the left cervical lymph chain        Infectious disease consulted, appreciated their recommendations - Blood cultures here as well as in [facility] are negative. Will cont pip/tazobcartam, if cultures remain negative, WBC continues to drop and swelling resolves can consider switch to oral regimen to complete 14 days of antibiotic therapy.    Note Author  Patricia Zayas, MS3 at the [facility] [employer]

## 2022-04-05 NOTE — CARE PLAN
Orientation: Alert and oriented times four.   HNT: Edema to left side of Neck. Denies any difficulty swallowing.    Respiratory: Lung sounds clear, even and unlabored   O2: Room air/ Sat WDL  Cardiac: S1S2. Dp Pulses palpable.   GI: Audible and active bowel sounds  : Voiding  IV: PIV clean dry and intact. Flushed.   Skin: WDL  VS: stable.   Patient safety: Hourly rounding completed. Call bell within reach and pt educated on assistance. Pt remained free from falls and injury.      12 hour chart check completed.               Problem: Pain - Standard  Goal: Alleviation of pain or a reduction in pain to the patient’s comfort goal  Outcome: Progressing     Problem: Knowledge Deficit - Standard  Goal: Patient and family/care givers will demonstrate understanding of plan of care, disease process/condition, diagnostic tests and medications  Outcome: Progressing   The patient is Watcher - Medium risk of patient condition declining or worsening    Shift Goals  Clinical Goals: Safety  Patient Goals: Rest, discharge    Progress made toward(s) clinical / shift goals:  Iv antibiotics    Patient is not progressing towards the following goals:

## 2022-04-05 NOTE — PROGRESS NOTES
"26 year old female seen in f/u for left neck infection  Has now been on broad spectrum antibiotics for 48 hours, actually starting to feel better, able to easier move her neck and less pain on the left side of her neck. No fever,tolerating the iv pip/tazobactam without side effects. The chest discomfort she had yesterday is resolving    Scheduled Medications   Medication Dose Frequency   • senna-docusate  2 Tablet BID   • Pharmacy Consult Request  1 Each PHARMACY TO DOSE   • piperacillin-tazobactam  3.375 g Q8HRS   • enoxaparin (LOVENOX) injection  40 mg DAILY     Review of Systems   Constitutional: Negative.    HENT: Negative for ear discharge and sore throat.    Eyes: Negative for blurred vision and double vision.   Respiratory: Negative for cough and shortness of breath.    Musculoskeletal: Positive for neck pain.   Skin: Negative for rash.     ./93   Pulse 86   Temp 36.4 °C (97.6 °F) (Temporal)   Resp 16   Ht 1.6 m (5' 3\")   Wt 75.5 kg (166 lb 7.2 oz)   SpO2 97%     Physical Exam  Vitals and nursing note reviewed.   Constitutional:       Comments: More comfortable appearing    HENT:      Mouth/Throat:      Pharynx: Oropharynx is clear.   Neck:      Comments: Decreased swelling  And less tender left neck side   Pulmonary:      Effort: No respiratory distress.      Breath sounds: Rhonchi present. No wheezing.   Chest:      Chest wall: No tenderness.   Musculoskeletal:      Cervical back: Tenderness present.   Skin:     Findings: No rash.     LABS  Results for OTILIA ASHBY (MRN [id number]) as of 4/4/2022 21:20   Ref. Range 4/2/2022 12:45   Significant Indicator Unknown NEG   Site Unknown PERIPHERAL   Source Unknown BLD     IMPRESSION:     1.  Redemonstrated is extensive inflammatory fat stranding throughout the left neck as well as extending into the superior mediastinum which is most consistent with infection. Asymmetric enlargement of the left sternocleidomastoid muscle suggesting   myositis.  2.  " There is no abscess.  3.  Enlarged left cervical lymph nodes are presumably reactive.  4.  There is focal significant narrowing of the left internal jugular vein in the upper neck at the C1-C2 level however there is no expansile filling defect to suggest acute thrombosis.             Exam Ended: 04/03/22 12:01 PM Last Resulted: 04/03/22 12:24 PM           Assess: resolving neck infection, clincaly seems to have Lemieres with the focal swellnig, fever and tenderness over left IJ and neck. Doing much better now with fever resolved and WBCs starting to come down. Blood cultures here as well as in [facility] are negative. Will cont pip/tazobcartam, if cultures remain negative, WBC continues to drop and swelling resolves can consider switch to oral regimen to complete 14 days of antibiotic therapy. Discussed with patient

## 2022-04-06 LAB
ANION GAP SERPL CALC-SCNC: 12 MMOL/L (ref 7–16)
BASOPHILS # BLD AUTO: 0.6 % (ref 0–1.8)
BASOPHILS # BLD: 0.06 K/UL (ref 0–0.12)
BUN SERPL-MCNC: 21 MG/DL (ref 8–22)
CALCIUM SERPL-MCNC: 9.3 MG/DL (ref 8.5–10.5)
CHLORIDE SERPL-SCNC: 105 MMOL/L (ref 96–112)
CO2 SERPL-SCNC: 22 MMOL/L (ref 20–33)
CREAT SERPL-MCNC: 0.84 MG/DL (ref 0.5–1.4)
EOSINOPHIL # BLD AUTO: 0.41 K/UL (ref 0–0.51)
EOSINOPHIL NFR BLD: 4.4 % (ref 0–6.9)
ERYTHROCYTE [DISTWIDTH] IN BLOOD BY AUTOMATED COUNT: 44.3 FL (ref 35.9–50)
GFR SERPLBLD CREATININE-BSD FMLA CKD-EPI: 98 ML/MIN/1.73 M 2
GLUCOSE SERPL-MCNC: 119 MG/DL (ref 65–99)
HCT VFR BLD AUTO: 39.7 % (ref 37–47)
HGB BLD-MCNC: 12.7 G/DL (ref 12–16)
IMM GRANULOCYTES # BLD AUTO: 0.05 K/UL (ref 0–0.11)
IMM GRANULOCYTES NFR BLD AUTO: 0.5 % (ref 0–0.9)
LYMPHOCYTES # BLD AUTO: 2.42 K/UL (ref 1–4.8)
LYMPHOCYTES NFR BLD: 26.1 % (ref 22–41)
MCH RBC QN AUTO: 29.7 PG (ref 27–33)
MCHC RBC AUTO-ENTMCNC: 32 G/DL (ref 33.6–35)
MCV RBC AUTO: 92.8 FL (ref 81.4–97.8)
MONOCYTES # BLD AUTO: 0.97 K/UL (ref 0–0.85)
MONOCYTES NFR BLD AUTO: 10.5 % (ref 0–13.4)
NEUTROPHILS # BLD AUTO: 5.37 K/UL (ref 2–7.15)
NEUTROPHILS NFR BLD: 57.9 % (ref 44–72)
NRBC # BLD AUTO: 0 K/UL
NRBC BLD-RTO: 0 /100 WBC
PLATELET # BLD AUTO: 274 K/UL (ref 164–446)
PMV BLD AUTO: 11.4 FL (ref 9–12.9)
POTASSIUM SERPL-SCNC: 4.1 MMOL/L (ref 3.6–5.5)
RBC # BLD AUTO: 4.28 M/UL (ref 4.2–5.4)
SODIUM SERPL-SCNC: 139 MMOL/L (ref 135–145)
WBC # BLD AUTO: 9.3 K/UL (ref 4.8–10.8)

## 2022-04-06 PROCEDURE — 99231 SBSQ HOSP IP/OBS SF/LOW 25: CPT | Mod: GC | Performed by: INTERNAL MEDICINE

## 2022-04-06 PROCEDURE — 700111 HCHG RX REV CODE 636 W/ 250 OVERRIDE (IP)

## 2022-04-06 PROCEDURE — A9270 NON-COVERED ITEM OR SERVICE: HCPCS | Performed by: HOSPITALIST

## 2022-04-06 PROCEDURE — 700105 HCHG RX REV CODE 258

## 2022-04-06 PROCEDURE — 700102 HCHG RX REV CODE 250 W/ 637 OVERRIDE(OP): Performed by: HOSPITALIST

## 2022-04-06 PROCEDURE — 770001 HCHG ROOM/CARE - MED/SURG/GYN PRIV*

## 2022-04-06 PROCEDURE — 85025 COMPLETE CBC W/AUTO DIFF WBC: CPT

## 2022-04-06 PROCEDURE — 36415 COLL VENOUS BLD VENIPUNCTURE: CPT

## 2022-04-06 PROCEDURE — 99232 SBSQ HOSP IP/OBS MODERATE 35: CPT | Mod: GC | Performed by: INTERNAL MEDICINE

## 2022-04-06 PROCEDURE — 80048 BASIC METABOLIC PNL TOTAL CA: CPT

## 2022-04-06 RX ADMIN — PIPERACILLIN SODIUM AND TAZOBACTAM SODIUM 3.38 G: 3; .375 INJECTION, POWDER, FOR SOLUTION INTRAVENOUS at 15:37

## 2022-04-06 RX ADMIN — ENOXAPARIN SODIUM 40 MG: 40 INJECTION SUBCUTANEOUS at 05:25

## 2022-04-06 RX ADMIN — PIPERACILLIN SODIUM AND TAZOBACTAM SODIUM 3.38 G: 3; .375 INJECTION, POWDER, FOR SOLUTION INTRAVENOUS at 21:49

## 2022-04-06 RX ADMIN — PIPERACILLIN SODIUM AND TAZOBACTAM SODIUM 3.38 G: 3; .375 INJECTION, POWDER, FOR SOLUTION INTRAVENOUS at 05:24

## 2022-04-06 RX ADMIN — ACETAMINOPHEN, ASPIRIN, CAFFEINE 1 TABLET: 250; 65; 250 TABLET, FILM COATED ORAL at 11:27

## 2022-04-06 ASSESSMENT — ENCOUNTER SYMPTOMS
FOCAL WEAKNESS: 0
CHILLS: 0
MYALGIAS: 1
VOMITING: 0
DIARRHEA: 1
DIZZINESS: 0
WHEEZING: 0
NECK PAIN: 1
FEVER: 0
PALPITATIONS: 0
CONSTIPATION: 0
HEADACHES: 0
DOUBLE VISION: 0
ORTHOPNEA: 0
WEIGHT LOSS: 0
SINUS PAIN: 0
RESPIRATORY NEGATIVE: 1
EYE PAIN: 1
SHORTNESS OF BREATH: 0
NAUSEA: 0
CONSTITUTIONAL NEGATIVE: 1
SORE THROAT: 0
COUGH: 1
ABDOMINAL PAIN: 0
PSYCHIATRIC NEGATIVE: 1
BLURRED VISION: 0
WEAKNESS: 0
EYE DISCHARGE: 0
COUGH: 0

## 2022-04-06 ASSESSMENT — FIBROSIS 4 INDEX
FIB4 SCORE: 0.32
FIB4 SCORE: 0.32

## 2022-04-06 ASSESSMENT — PAIN DESCRIPTION - PAIN TYPE: TYPE: ACUTE PAIN

## 2022-04-06 NOTE — CARE PLAN
The patient is Stable - Low risk of patient condition declining or worsening    Shift Goals  Clinical Goals: Transfer to medical floor  Patient Goals: Rest    Progress made toward(s) clinical / shift goals:  Transfer has not happened as of now. Patient removed from telemetry box and awaiting medical bed.

## 2022-04-06 NOTE — PROGRESS NOTES
Received report and assumed care of patient at change of shift. Patient resting comfortably in bed. Plan of care discussed. All questions answered. Patient is now medical. Awaiting medical bed placement.

## 2022-04-06 NOTE — NON-PROVIDER
Daily Progress Note:     Date of Service: 4/6/2022  Primary Team: [facility] White Team   Attending: JUSTIN Tomas   Senior Resident: Dr. Osei  Intern: Dr. 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     Overnight, Sudha noted that her left eye began to hurt, similarly to when she gets migraines, but she also noted she had far more screen time than usual. She had difficulty sleeping, and woke up tired, with that left eye pain persisting.     Her chest pain is resolved, and her neck pain is a 4/10 with rest and 5/10 with motion. Her ROM has continued to improve, and she has less pain with neck manipulation.     Consultants/Specialty:  Infectious Disease    Review of Systems:  Review of Systems   Constitutional: Negative for chills, fever and malaise/fatigue.   HENT: Negative for nosebleeds.    Eyes: Positive for pain.        The left eye has a dull achy pain, that was present when she went to asleep, and persisted when she awoke.    Respiratory: Negative.    Cardiovascular: Negative for chest pain, palpitations and orthopnea.   Musculoskeletal: Positive for neck pain.   Skin: Negative.    Neurological: Negative for dizziness and headaches.   Psychiatric/Behavioral: Negative.        Objective Data:   Physical Exam:   Vitals:   Temp:  [35.8 °C (96.5 °F)-36.7 °C (98 °F)] 35.8 °C (96.5 °F)  Pulse:  [63-97] 63  Resp:  [16-18] 18  BP: ()/(62-80) 111/66  SpO2:  [96 %-99 %] 96 %    Physical Exam  Constitutional:       Appearance: Normal appearance.   HENT:      Nose: Nose normal.      Mouth/Throat:      Mouth: Mucous membranes are moist.      Pharynx: Oropharynx  is clear.   Eyes:      Extraocular Movements: Extraocular movements intact.      Conjunctiva/sclera: Conjunctivae normal.      Pupils: Pupils are equal, round, and reactive to light.   Neck:      Comments: The neck is tender to palpation, but the swelling has decreased slightly  Cardiovascular:      Rate and Rhythm: Normal rate and regular rhythm.      Heart sounds: Normal heart sounds.   Pulmonary:      Effort: Pulmonary effort is normal.      Breath sounds: Normal breath sounds.   Musculoskeletal:      Cervical back: Rigidity and tenderness present.   Lymphadenopathy:      Cervical: Cervical adenopathy present.      Left cervical: Superficial cervical adenopathy present.   Neurological:      General: No focal deficit present.      Mental Status: She is alert and oriented to person, place, and time.   Psychiatric:         Mood and Affect: Mood normal.         Behavior: Behavior normal.       Labs/Imaging:  Recent Labs     04/04/22 0050 04/05/22 0229 04/06/22 0205   WBC 13.2* 8.4 9.3   RBC 4.06* 4.08* 4.28   HEMOGLOBIN 12.1 12.2 12.7   HEMATOCRIT 37.2 37.9 39.7   MCV 91.6 92.9 92.8   MCH 29.8 29.9 29.7   RDW 44.3 44.7 44.3   PLATELETCT 169 209 274   MPV 11.5 11.6 11.4   NEUTSPOLYS 78.60* 63.90 57.90   LYMPHOCYTES 9.00* 20.60* 26.10   MONOCYTES 7.90 9.50 10.50   EOSINOPHILS 3.70 4.90 4.40   BASOPHILS 0.30 0.50 0.60     Recent Labs     04/04/22 0050 04/05/22 0229 04/06/22  0205   SODIUM 138 137 139   POTASSIUM 4.0 4.1 4.1   CHLORIDE 105 105 105   CO2 22 22 22   GLUCOSE 120* 109* 119*   BUN 6* 9 21     Recent Labs     04/04/22  0050 04/05/22 0229 04/06/22  0205   ALBUMIN 3.5 3.7  --    TBILIRUBIN 0.5 0.3  --    ALKPHOSPHAT 61 53  --    TOTPROTEIN 6.4 6.7  --    ALTSGPT 10 12  --    ASTSGOT 12 12  --    CREATININE 0.67 0.65 0.84     .    Imaging     4/5/22 - CT of the chest which came back with left supraclavicular edema, trace effusions with left lower lobe atelectasis versus infiltrate and no evidence of a  PE.    4/5/22 - He CT of the neck read extensive inflammatory fat stranding throughout the left neck as well as extending into the superior mediastinum which is most consistent with infection. Asymmetric enlargement of the left sternocleidomastoid muscle suggesting   Myositis. There is focal significant narrowing of the left internal jugular vein in the upper neck at the C1-C2 level however there is no expansile filling defect to suggest acute thrombosis.    Problem Representation:  Tahira is a previously healthy 26 year old female, (HD 5) with a pmhx of migraines tx with Sumaptriptan (none recently), presenting with acute onset of severe left ear and neck pain with 3/4 SIRS criteria, internal jugular vein thrombus on CT, and s/s of Lemierre syndrome.      Sepsis   #SIRS 3/4 with leukocytosis (>12,000), tachycardia (>90BPM), and fever (>101F)  The patient originally presented to [facility] for symptoms, where she was treated with Rocephin after a blood culture was obtained.      · 4/3/22: WBC trending up from 14 to 16.4, pro calcitonin and lactic acid negative, continued tachycardia and fever. Fluid resuscitation ordered, 2L IVF  · 4/4/22: WBC continue to trend down, afebrile overnight (last dose of Tylenol at 1454 4/3/22), and tachycardia resolved over the last 24 hours.  · [facility] Lisa noted that the blood cultures at 48 hours showed no sign's of growth. Her direct line to call back at the 5 day point is [phone]  · 4/5/22: Leukocytosis resolved (8.4), nonfebrile, and no longer tachycardic (72 to 85)\  · [facility] Med Tech Sonia stated that blood cultures currently show no growth (12:42 pm)  · 4/6/22: Leukocytosis trending up, but still normal levels (9.3)  · [facility] Baylee stated the blood culutures were both negative as of 11:18 on 4/6/22     Pending blood culture results from OSH, and [facility]  Continue Tylenol prn, and IV Zosyn. Based on cultures, consider d/c with IV abx  outpatient      Lemierre Syndrome  Tahira had pharyngitis 10 days prior to the onset of symptoms, that lasted for 4 days with no other symptoms, and resolved spontaneously. This along with current sequela, are concerning for Lemierre's (Internal jugular vein thrombus).     · 4/3/22: Pain has extended into the anterior mediastinum. Case reports have shown complications of pulmonary involvement, septic emboli, pulmonary infiltrate, pneumothorax, or necrotizing mediastinitis (Uptodate). Due to the severity of these complications, a CT of the chest and neck with contrast will be ordered.   · 4/4/22: Imaging demonstrated extending infection into the superior mediastinum, as well as either a left lower lobe atelectases vs infiltrate. Findings are less concerning for an ongoing thrombus. Results from the ear culture demonstrated S. Aureus, and blood is pending.   · 4/5/22: Swelling has reduced, and now a firm mass is easily palpable on the left cervical lymph chain  · 4/6/22: Swelling continues to reduce, and pain is now a 4/10 at rest and 5/10 with movement.         Infectious disease consulted, appreciated their recommendations - Blood cultures here as well as in [facility] are negative. Will cont pip/tazobcartam, if cultures remain negative, WBC continues to drop and swelling resolves can consider switch to oral regimen to complete 14 days of antibiotic therapy.    Note Author  Patricia Zayas, MS3 at the [facility] [employer]

## 2022-04-06 NOTE — PROGRESS NOTES
"26 year old female seen in f/u for left neck infection  Has now been on broad spectrum antibiotics for 4 days, she is  feel better, able to easier move her neck and less pain on the left side of her neck. No fever,tolerating the iv pip/tazobactam without side effects. Denies chest discomfort, mild cough, pain down to 4/10 from 12/10. No fever for 2 days.   Scheduled Medications   Medication Dose Frequency   • senna-docusate  2 Tablet BID   • Pharmacy Consult Request  1 Each PHARMACY TO DOSE   • piperacillin-tazobactam  3.375 g Q8HRS   • enoxaparin (LOVENOX) injection  40 mg DAILY     Review of Systems   Constitutional: Negative.    HENT: Negative for ear discharge and sore throat.    Eyes: Negative for blurred vision and double vision.   Respiratory: Negative for cough and shortness of breath.    Musculoskeletal: Positive for neck pain.   Skin: Negative for rash.     ./73   Pulse 66   Temp 36.3 °C (97.4 °F) (Temporal)   Resp 18   Ht 1.6 m (5' 3\")   Wt 75.4 kg (166 lb 3.6 oz)   SpO2 98%     Physical Exam  Vitals and nursing note reviewed.   Constitutional:       Comments: More comfortable appearing    HENT:      Mouth/Throat:      Pharynx: Oropharynx is clear.   Neck:      Comments: Decreased swelling , except  At top of neck below left ear is a somehat indurated area, not warm or red, not flucrulanr   Pulmonary:      Effort: No respiratory distress.      Breath sounds: Rhonchi present. No wheezing.   Chest:      Chest wall: No tenderness.   Musculoskeletal:      Cervical back: Tenderness present.   Skin:     Findings: No rash.     LABS  Results for OTILIA ASHBY (MRN [id number]) as of 4/6/2022 15:31   Ref. Range 4/2/2022 13:05   Significant Indicator Unknown NEG   Site Unknown PERIPHERAL   Source Unknown BLD               Exam Ended: 04/03/22 12:01 PM Last Resulted: 04/03/22 12:24 PM           Assess: resolving neck infection, clincally seems to have Lemieres with the focal swelling, fever and " tenderness over left IJ and neck. However, has negative blood cultures so far here and at [facility], plus she has no septic emboli on Chest CT scan or skin lesions, plus the CT scan reported to show compression of the IJ by the tissue swelling snd not a clot.  Doing much better now with fever resolved and WBCs normalized . Will cont the pip/tazobactam  till tomorrow afternoon, and if cont to do well transition to additional 2 week course of Augmentin 875 mg BID to cover fusobacterium as well as oral sandy/causes of pharyngitis. Discussed with patient and her Mom.  Will see tomorrow in f/u

## 2022-04-06 NOTE — PROGRESS NOTES
Daily Progress Note:     Date of Service: 4/6/2022  Primary Team: [facility] White Team   Attending: JUSTIN Tomas   Senior Resident: Dr. Osei  Intern: Romana Zaldivar M.D. MPH   Contact:  [phone]    ID: Ms. Sudha Chi is a 26 y.o. 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 Patient continues to improve with antibiotic therapy.    Subjective  -No acute events overnight  -afebrile overnight with normal heart rates  -WBC remains within normal limits   -Improved neck range of motion  -Improved neck pain and discomfort  -Patient suspects possible onset of migraine secondary to left eyestrain which usually precedes the migraine; Excedrin as needed ordered  -Outside hospital cultures and inpatient cultures with no growth to date  -Patient reports neck pain as 4/10 at rest and 5/10 with movement    Consultants/Specialty:  ID    Review of Systems:    Review of Systems   Constitutional: Negative for chills, fever and weight loss.   HENT: Negative for congestion, ear pain, nosebleeds and sinus pain.         Denies any swallowing difficulties   Eyes: Positive for pain (Patient reports left eye pain of 2 days duration patient reports that she has a chronic history of left eye pain usually preceding a migraine.  The eye pain is not associated with movement is more so over strain/discomfort.). Negative for blurred vision, double vision and discharge.   Respiratory: Positive for cough. Negative for shortness of breath and wheezing.    Cardiovascular: Negative for chest pain, palpitations and leg swelling.   Gastrointestinal: Positive for diarrhea (reports 2 loose stools). Negative for abdominal pain, constipation, nausea and vomiting.   Genitourinary: Negative for  dysuria, hematuria and urgency.   Musculoskeletal: Positive for myalgias and neck pain.   Neurological: Negative for dizziness, focal weakness, weakness and headaches.       Objective Data:   Physical Exam:   Vitals:   Temp:  [35.8 °C (96.5 °F)-36.6 °C (97.8 °F)] 36.3 °C (97.4 °F)  Pulse:  [63-97] 66  Resp:  [16-18] 18  BP: ()/(62-78) 115/73  SpO2:  [96 %-99 %] 98 %    Physical Exam  Constitutional:       General: She is not in acute distress.     Appearance: She is not ill-appearing or diaphoretic.   HENT:      Head: Normocephalic and atraumatic.      Ears:      Comments: No visible active draining. Severe posterior auricular tenderness.      Nose: No congestion or rhinorrhea.      Mouth/Throat:      Mouth: Mucous membranes are moist.      Pharynx: Oropharynx is clear. No oropharyngeal exudate or posterior oropharyngeal erythema.   Eyes:      General: No scleral icterus.        Right eye: No discharge.         Left eye: No discharge.      Extraocular Movements: Extraocular movements intact.      Conjunctiva/sclera: Conjunctivae normal.      Pupils: Pupils are equal, round, and reactive to light.   Neck:      Comments: Visibly swollen Left neck. Tenderness with palpation.   ROM limited due to pain and swelling - limited flexion and left sided rotation. Lower left neck swelling improved  Cardiovascular:      Rate and Rhythm: Normal rate and regular rhythm.      Pulses: Normal pulses.      Heart sounds: Normal heart sounds. No murmur heard.  Pulmonary:      Effort: Pulmonary effort is normal. No respiratory distress.      Breath sounds: Normal breath sounds.      Comments: Tenderness to palpation over the mediastinum  Chest:      Chest wall: Tenderness present.   Abdominal:      General: Abdomen is flat. Bowel sounds are normal. There is no distension.      Palpations: Abdomen is soft.      Tenderness: There is no abdominal tenderness. There is no guarding.      Comments: No hepatosplenomegally    Musculoskeletal:         General: No swelling, deformity or signs of injury. Normal range of motion.      Cervical back: Rigidity and tenderness present.      Right lower leg: No edema.      Left lower leg: No edema.   Skin:     General: Skin is warm and dry.      Findings: No lesion or rash.   Neurological:      General: No focal deficit present.      Mental Status: She is alert and oriented to person, place, and time.      Cranial Nerves: No cranial nerve deficit.      Sensory: No sensory deficit.      Motor: No weakness.   Psychiatric:         Mood and Affect: Mood normal.         Behavior: Behavior normal.         Thought Content: Thought content normal.         Judgment: Judgment normal.       Labs:   Recent Labs     04/04/22 0050 04/05/22 0229 04/06/22  0205   WBC 13.2* 8.4 9.3   RBC 4.06* 4.08* 4.28   HEMOGLOBIN 12.1 12.2 12.7   HEMATOCRIT 37.2 37.9 39.7   MCV 91.6 92.9 92.8   MCH 29.8 29.9 29.7   RDW 44.3 44.7 44.3   PLATELETCT 169 209 274   MPV 11.5 11.6 11.4   NEUTSPOLYS 78.60* 63.90 57.90   LYMPHOCYTES 9.00* 20.60* 26.10   MONOCYTES 7.90 9.50 10.50   EOSINOPHILS 3.70 4.90 4.40   BASOPHILS 0.30 0.50 0.60     Recent Labs     04/04/22 0050 04/05/22 0229 04/06/22  0205   SODIUM 138 137 139   POTASSIUM 4.0 4.1 4.1   CHLORIDE 105 105 105   CO2 22 22 22   GLUCOSE 120* 109* 119*   BUN 6* 9 21     Recent Labs     04/04/22  0050 04/05/22 0229 04/06/22  0205   ALBUMIN 3.5 3.7  --    TBILIRUBIN 0.5 0.3  --    ALKPHOSPHAT 61 53  --    TOTPROTEIN 6.4 6.7  --    ALTSGPT 10 12  --    ASTSGOT 12 12  --    CREATININE 0.67 0.65 0.84     Imaging:   CT-SOFT TISSUE NECK WITH   Final Result      1.  Redemonstrated is extensive inflammatory fat stranding throughout the left neck as well as extending into the superior mediastinum which is most consistent with infection. Asymmetric enlargement of the left sternocleidomastoid muscle suggesting    myositis.   2.  There is no abscess.   3.  Enlarged left cervical lymph nodes  are presumably reactive.   4.  There is focal significant narrowing of the left internal jugular vein in the upper neck at the C1-C2 level however there is no expansile filling defect to suggest acute thrombosis.      CT-CTA CHEST PULMONARY ARTERY W/ RECONS   Final Result      1.  No evidence of pulmonary embolus.      2.  Trace effusions with left lower lobe atelectasis versus infiltrate.      3.  Left supraclavicular edema.            US-EXTREMITY VENOUS UPPER UNILAT LEFT   Final Result      DX-CHEST-2 VIEWS   Final Result      No acute cardiopulmonary abnormality.      MR-MRA NECK-WITH   Final Result      1.  Findings highly suspicious for occlusion of the peripheral portion of the LEFT internal jugular vein as seen on the outside CT. This could be sterile or related to septic thrombophlebitis.   2.  Inflammatory changes in the LEFT neck with enlargement of adjacent lymph nodes likely infectious with components of cellulitis and myositis, less likely primary peritonitis.   3.  Unremarkable appearance of the internal carotid arteries   4.  If further imaging assessment is pursued, repeat contrast enhanced neck CT is recommended. Ultrasound may also be of value.      CT-FOREIGN FILM CAT SCAN   Final Result      CT-FOREIGN FILM CAT SCAN   Final Result        CT-SOFT TISSUE NECK WITH  4/3/22  1.  Redemonstrated is extensive inflammatory fat stranding throughout the left neck as well as extending into the superior mediastinum which is most consistent with infection. Asymmetric enlargement of the left sternocleidomastoid muscle suggesting   myositis.   2.  There is no abscess.   3.  Enlarged left cervical lymph nodes are presumably reactive.   4.  There is focal significant narrowing of the left internal jugular vein in the upper neck at the C1-C2 level however there is no expansile filling defect to suggest acute thrombosis.    CT-CTA CHEST PULMONARY ARTERY W/ RECONS  4/3/22  1.  No evidence of pulmonary embolus.   2.   Trace effusions with left lower lobe atelectasis versus infiltrate.   3.  Left supraclavicular edema.     Problem Representation:   Ms. Sudha Chi is a 26 y.o. 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 Patient is on zosyn (started 4/2), with improved fevers, tachycardia, and range of motion in neck. Cultures still pending, no growth to date.    * Lemierre syndrome  Assessment & Plan  History of pharyngitis prior to presenting symptoms concerning for possible Lemierre's. MRA revealed inflammation and extensive lymphadenopathy. Started on IV zosyn 4/2. Outside hospital cultures and inpatient cultures with no growth to date. ENT consulted- signed off, ID continues to follow.  -ID recs appreciated  -continue zosyn abx    -no indication for anticoagulation at this time, will reassess depending on symptoms  -f/u OSH blood cx    Sepsis (HCC)- (present on admission)  Assessment & Plan  This is Sepsis Present on admission  SIRS criteria identified on my evaluation include: Fever, with temperature greater than 101 deg F, Tachycardia, with heart rate greater than 90 BPM and Leukocytosis, with WBC greater than 12,000  Source is likely IJV thrombophlebitis  Sepsis protocol initiated  Fluid resuscitation ordered per protocol  Crystalloid Fluid Administration: s/p 2 L IVF  LA neg  Procal Neg  WBC up trending 16.4 from 14 at admission but down trended on 4/4 to 13.2 and normalized on 4/5.   HD stable though with continued tacycardia  Ear fluid cx 4/2: NGTD  Blood culture from OSH- unknown results  Blood cx 4/3- NGTD.  Fevers and tachycardia resolved 4/4  -prn tylenol for fever  -IV zosyn for suspected Lemierre's  -CTM    Migraine without aura and without status migrainosus, not intractable-  (present on admission)  Assessment & Plan  Patient reports her last migraine was 3 months ago.  Patient reports that she is experiencing some left eyestrain starting 4/5/2022 is usually precedes the onset of a migraine.  Patient reports in the past her migraine has been responsive to Excedrin, but sumatriptan led to increased neck stiffness and discomfort.  -Excedrin as needed    Romana Zaldivar M.D. MPH

## 2022-04-06 NOTE — CARE PLAN
Problem: Pain - Standard  Goal: Alleviation of pain or a reduction in pain to the patient’s comfort goal  Outcome: Progressing     Problem: Knowledge Deficit - Standard  Goal: Patient and family/care givers will demonstrate understanding of plan of care, disease process/condition, diagnostic tests and medications  Outcome: Progressing   The patient is Stable - Low risk of patient condition declining or worsening    Shift Goals  Clinical Goals: antibiotics  Patient Goals: rest    Progress made toward(s) clinical / shift goals:      Patient is not progressing towards the following goals:

## 2022-04-06 NOTE — ASSESSMENT & PLAN NOTE
Patient reports her last migraine was 3 months ago.  Patient reports that she is experiencing some left eyestrain starting 4/5/2022 is usually precedes the onset of a migraine.  Patient reports in the past her migraine has been responsive to Excedrin, but sumatriptan led to increased neck stiffness and discomfort. Resolved, continue to monitor.  -Excedrin as needed

## 2022-04-06 NOTE — DISCHARGE PLANNING
Anticipated Discharge Disposition: Home when clear    Action: Patient discussed during IDT rounds.  Patient is currently on IV Zosyn, final antibiotic needs will be determined by culture results.  Medical team anticipates discharge on Friday.    Barriers to Discharge: Medical clearance     Plan: Case coordination to continue to follow up with medical team to discuss discharge barriers.

## 2022-04-07 ENCOUNTER — PHARMACY VISIT (OUTPATIENT)
Dept: PHARMACY | Facility: MEDICAL CENTER | Age: 27
End: 2022-04-07
Payer: COMMERCIAL

## 2022-04-07 VITALS
TEMPERATURE: 98.3 F | RESPIRATION RATE: 18 BRPM | BODY MASS INDEX: 29.53 KG/M2 | WEIGHT: 166.67 LBS | SYSTOLIC BLOOD PRESSURE: 143 MMHG | DIASTOLIC BLOOD PRESSURE: 90 MMHG | OXYGEN SATURATION: 99 % | HEART RATE: 101 BPM | HEIGHT: 63 IN

## 2022-04-07 LAB
ANION GAP SERPL CALC-SCNC: 11 MMOL/L (ref 7–16)
BACTERIA BLD CULT: NORMAL
BACTERIA BLD CULT: NORMAL
BASOPHILS # BLD AUTO: 0.6 % (ref 0–1.8)
BASOPHILS # BLD: 0.07 K/UL (ref 0–0.12)
BUN SERPL-MCNC: 13 MG/DL (ref 8–22)
CALCIUM SERPL-MCNC: 9.2 MG/DL (ref 8.5–10.5)
CHLORIDE SERPL-SCNC: 103 MMOL/L (ref 96–112)
CO2 SERPL-SCNC: 22 MMOL/L (ref 20–33)
CREAT SERPL-MCNC: 0.7 MG/DL (ref 0.5–1.4)
EOSINOPHIL # BLD AUTO: 0.31 K/UL (ref 0–0.51)
EOSINOPHIL NFR BLD: 2.8 % (ref 0–6.9)
ERYTHROCYTE [DISTWIDTH] IN BLOOD BY AUTOMATED COUNT: 43.4 FL (ref 35.9–50)
EST. AVERAGE GLUCOSE BLD GHB EST-MCNC: 97 MG/DL
GFR SERPLBLD CREATININE-BSD FMLA CKD-EPI: 122 ML/MIN/1.73 M 2
GLUCOSE SERPL-MCNC: 117 MG/DL (ref 65–99)
HBA1C MFR BLD: 5 % (ref 4–5.6)
HCT VFR BLD AUTO: 38.9 % (ref 37–47)
HGB BLD-MCNC: 12.8 G/DL (ref 12–16)
IMM GRANULOCYTES # BLD AUTO: 0.08 K/UL (ref 0–0.11)
IMM GRANULOCYTES NFR BLD AUTO: 0.7 % (ref 0–0.9)
LYMPHOCYTES # BLD AUTO: 2.48 K/UL (ref 1–4.8)
LYMPHOCYTES NFR BLD: 22.1 % (ref 22–41)
MCH RBC QN AUTO: 29.8 PG (ref 27–33)
MCHC RBC AUTO-ENTMCNC: 32.9 G/DL (ref 33.6–35)
MCV RBC AUTO: 90.7 FL (ref 81.4–97.8)
MONOCYTES # BLD AUTO: 1.06 K/UL (ref 0–0.85)
MONOCYTES NFR BLD AUTO: 9.5 % (ref 0–13.4)
NEUTROPHILS # BLD AUTO: 7.21 K/UL (ref 2–7.15)
NEUTROPHILS NFR BLD: 64.3 % (ref 44–72)
NRBC # BLD AUTO: 0 K/UL
NRBC BLD-RTO: 0 /100 WBC
PLATELET # BLD AUTO: 291 K/UL (ref 164–446)
PMV BLD AUTO: 11.2 FL (ref 9–12.9)
POTASSIUM SERPL-SCNC: 3.8 MMOL/L (ref 3.6–5.5)
RBC # BLD AUTO: 4.29 M/UL (ref 4.2–5.4)
SIGNIFICANT IND 70042: NORMAL
SIGNIFICANT IND 70042: NORMAL
SITE SITE: NORMAL
SITE SITE: NORMAL
SODIUM SERPL-SCNC: 136 MMOL/L (ref 135–145)
SOURCE SOURCE: NORMAL
SOURCE SOURCE: NORMAL
WBC # BLD AUTO: 11.2 K/UL (ref 4.8–10.8)

## 2022-04-07 PROCEDURE — 700102 HCHG RX REV CODE 250 W/ 637 OVERRIDE(OP): Performed by: HOSPITALIST

## 2022-04-07 PROCEDURE — 80048 BASIC METABOLIC PNL TOTAL CA: CPT

## 2022-04-07 PROCEDURE — RXOTC WILLOW AMBULATORY OTC CHARGE: Performed by: PHARMACIST

## 2022-04-07 PROCEDURE — 83036 HEMOGLOBIN GLYCOSYLATED A1C: CPT

## 2022-04-07 PROCEDURE — 85025 COMPLETE CBC W/AUTO DIFF WBC: CPT

## 2022-04-07 PROCEDURE — 36415 COLL VENOUS BLD VENIPUNCTURE: CPT

## 2022-04-07 PROCEDURE — 700111 HCHG RX REV CODE 636 W/ 250 OVERRIDE (IP)

## 2022-04-07 PROCEDURE — A9270 NON-COVERED ITEM OR SERVICE: HCPCS | Performed by: HOSPITALIST

## 2022-04-07 PROCEDURE — 99238 HOSP IP/OBS DSCHRG MGMT 30/<: CPT | Mod: GC | Performed by: INTERNAL MEDICINE

## 2022-04-07 PROCEDURE — 700105 HCHG RX REV CODE 258

## 2022-04-07 PROCEDURE — 99231 SBSQ HOSP IP/OBS SF/LOW 25: CPT | Mod: GC | Performed by: INTERNAL MEDICINE

## 2022-04-07 PROCEDURE — RXMED WILLOW AMBULATORY MEDICATION CHARGE: Performed by: HOSPITALIST

## 2022-04-07 RX ORDER — CYCLOBENZAPRINE HCL 10 MG
10 TABLET ORAL
Qty: 3 TABLET | Refills: 0 | Status: SHIPPED | OUTPATIENT
Start: 2022-04-07

## 2022-04-07 RX ORDER — AMOXICILLIN AND CLAVULANATE POTASSIUM 875; 125 MG/1; MG/1
1 TABLET, FILM COATED ORAL 2 TIMES DAILY
Qty: 28 TABLET | Refills: 0 | Status: SHIPPED | OUTPATIENT
Start: 2022-04-07 | End: 2022-04-07 | Stop reason: SDUPTHER

## 2022-04-07 RX ORDER — POTASSIUM CHLORIDE 20 MEQ/1
20 TABLET, EXTENDED RELEASE ORAL ONCE
Status: COMPLETED | OUTPATIENT
Start: 2022-04-07 | End: 2022-04-07

## 2022-04-07 RX ORDER — AMOXICILLIN AND CLAVULANATE POTASSIUM 875; 125 MG/1; MG/1
1 TABLET, FILM COATED ORAL 2 TIMES DAILY
Qty: 28 TABLET | Refills: 0 | Status: SHIPPED | OUTPATIENT
Start: 2022-04-07 | End: 2022-04-21

## 2022-04-07 RX ADMIN — PIPERACILLIN SODIUM AND TAZOBACTAM SODIUM 3.38 G: 3; .375 INJECTION, POWDER, FOR SOLUTION INTRAVENOUS at 06:00

## 2022-04-07 RX ADMIN — POTASSIUM CHLORIDE 20 MEQ: 20 TABLET, EXTENDED RELEASE ORAL at 08:37

## 2022-04-07 RX ADMIN — PIPERACILLIN SODIUM AND TAZOBACTAM SODIUM 3.38 G: 3; .375 INJECTION, POWDER, FOR SOLUTION INTRAVENOUS at 13:18

## 2022-04-07 RX ADMIN — ENOXAPARIN SODIUM 40 MG: 40 INJECTION SUBCUTANEOUS at 05:34

## 2022-04-07 ASSESSMENT — ENCOUNTER SYMPTOMS
EYES NEGATIVE: 1
RESPIRATORY NEGATIVE: 1
CHILLS: 0
FOCAL WEAKNESS: 0
WEAKNESS: 0
WHEEZING: 0
HEADACHES: 0
SHORTNESS OF BREATH: 0
NAUSEA: 0
TINGLING: 0
COUGH: 0
DIARRHEA: 1
SORE THROAT: 0
BLURRED VISION: 0
PALPITATIONS: 0
SENSORY CHANGE: 0
COUGH: 1
ORTHOPNEA: 0
CONSTITUTIONAL NEGATIVE: 1
MYALGIAS: 0
NECK PAIN: 1
EYE DISCHARGE: 0
DOUBLE VISION: 0
EYE PAIN: 0
VOMITING: 0
DIZZINESS: 0
SINUS PAIN: 0
WEIGHT LOSS: 0
CONSTIPATION: 0
ABDOMINAL PAIN: 0
PSYCHIATRIC NEGATIVE: 1
FEVER: 0

## 2022-04-07 ASSESSMENT — PAIN DESCRIPTION - PAIN TYPE: TYPE: ACUTE PAIN

## 2022-04-07 ASSESSMENT — FIBROSIS 4 INDEX: FIB4 SCORE: 0.3

## 2022-04-07 NOTE — CARE PLAN
The patient is Stable - Low risk of patient condition declining or worsening    Shift Goals  Clinical Goals: ATB's, rest, safety  Patient Goals: rest and go home soon  Family Goals: hardeep    Progress made toward(s) clinical / shift goals:      Problem: Pain - Standard  Goal: Alleviation of pain or a reduction in pain to the patient’s comfort goal  Outcome: Progressing     Problem: Knowledge Deficit - Standard  Goal: Patient and family/care givers will demonstrate understanding of plan of care, disease process/condition, diagnostic tests and medications  Outcome: Progressing       Patient is not progressing towards the following goals:

## 2022-04-07 NOTE — CARE PLAN
Problem: Pain - Standard  Goal: Alleviation of pain or a reduction in pain to the patient’s comfort goal  Outcome: Progressing     Problem: Knowledge Deficit - Standard  Goal: Patient and family/care givers will demonstrate understanding of plan of care, disease process/condition, diagnostic tests and medications  Outcome: Progressing   The patient is Stable - Low risk of patient condition declining or worsening    Shift Goals  Clinical Goals: Transfer to medical floor  Patient Goals: rest    Progress made toward(s) clinical / shift goals:      Patient is not progressing towards the following goals:

## 2022-04-07 NOTE — PROGRESS NOTES
"26 year old female seen in f/u for left neck infection  Has now been on broad spectrum antibiotics for last 5  days, she is  Feeling much better, able to easier move her neck and less pain on the left side of her neck. No fever,tolerating the iv pip/tazobactam without side effects. Denies chest discomfort, mild cough, pain down to 4/10 from 12/10. No fever for 3 days.   Scheduled Medications   Medication Dose Frequency   • senna-docusate  2 Tablet BID   • Pharmacy Consult Request  1 Each PHARMACY TO DOSE   • piperacillin-tazobactam  3.375 g Q8HRS   • enoxaparin (LOVENOX) injection  40 mg DAILY     Review of Systems   Constitutional: Negative.    HENT: Negative for ear discharge and sore throat.    Eyes: Negative for blurred vision and double vision.   Respiratory: Negative for cough and shortness of breath.    Musculoskeletal: Positive for neck pain.   Skin: Negative for rash.     ./68   Pulse 65   Temp 36.1 °C (97 °F) (Temporal)   Resp 16   Ht 1.6 m (5' 3\")   Wt 75.6 kg (166 lb 10.7 oz)   SpO2 97%     Physical Exam  Vitals and nursing note reviewed.   Constitutional:       Comments:  comfortable appearing, smiling,  in a good mood, eating food without problem    HENT:      Mouth/Throat:      Pharynx: Oropharynx is clear.   Neck:      Comments: Decreased swelling , except  At top of neck below left ear is a somehat indurated area, not warm or red,samller than yesterday   Pulmonary:      Effort: No respiratory distress.      Breath sounds: No wheezing or rhonchi.   Chest:      Chest wall: No tenderness.   Musculoskeletal:      Cervical back: Tenderness present.   Skin:     Findings: No rash.     LABS  Results for OTILIA ASHBY (MRN [id number]) as of 4/6/2022 15:31   Ref. Range 4/2/2022 13:05   Significant Indicator Unknown NEG   Site Unknown PERIPHERAL   Source Unknown BLD         Assess: resolving neck infection, clincally seems to have Lemieres with the initial focal swelling, fever and tenderness " over left IJ and neck. However, her now finalized  negative blood cultures at [facility], plus she has no septic emboli on Chest CT scan or skin lesions, plus the CT scan reported to show compression of the IJ by the tissue swelling snd not a clot.  Doing much better now with fever resolved and WBCs  Mildly increased, and exam better every day.  Will transition  Her to an additional 2 week course of Augmentin 875 mg BID to cover fusobacterium as well as oral sandy/causes of pharyngitis. Discussed with patient and her Mom and Med Team  Home today, I have scheduled her for a f/u in [facility][note 3] on Monday at 9:20 a.m.

## 2022-04-07 NOTE — DISCHARGE PLANNING
Anticipated Discharge Disposition:   Home when medically cleared with PO antibiotics     Action:   Chart review complete.     Per ID note, patient to transition to PO Augmentin upon discharge.     No further discharge needs anticipated.     Barriers to Discharge:   Medical Clearance    Plan:   Hospital care management will continue to assist with discharge planning needs.

## 2022-04-07 NOTE — DISCHARGE INSTRUCTIONS
Discharge Instructions    Discharged to home by car with relative. Discharged via wheelchair, hospital escort: Yes.  Special equipment needed: Not Applicable    Be sure to schedule a follow-up appointment with your primary care doctor or any specialists as instructed.     Discharge Plan:        I understand that a diet low in cholesterol, fat, and sodium is recommended for good health. Unless I have been given specific instructions below for another diet, I accept this instruction as my diet prescription.   Other diet: Regular    Special Instructions: None    · Is patient discharged on Warfarin / Coumadin?   No     Depression / Suicide Risk    As you are discharged from this [facility] facility, it is important to learn how to keep safe from harming yourself.    Recognize the warning signs:  · Abrupt changes in personality, positive or negative- including increase in energy   · Giving away possessions  · Change in eating patterns- significant weight changes-  positive or negative  · Change in sleeping patterns- unable to sleep or sleeping all the time   · Unwillingness or inability to communicate  · Depression  · Unusual sadness, discouragement and loneliness  · Talk of wanting to die  · Neglect of personal appearance   · Rebelliousness- reckless behavior  · Withdrawal from people/activities they love  · Confusion- inability to concentrate     If you or a loved one observes any of these behaviors or has concerns about self-harm, here's what you can do:  · Talk about it- your feelings and reasons for harming yourself  · Remove any means that you might use to hurt yourself (examples: pills, rope, extension cords, firearm)  · Get professional help from the community (Mental Health, Substance Abuse, psychological counseling)  · Do not be alone:Call your Safe Contact- someone whom you trust who will be there for you.  · Call your local CRISIS HOTLINE 990-7150 or 936-569-2836  · Call your local Children's Mobile  Crisis Response Team [place] (708) 317-9550 or www.[employer]  · Call the toll free National Suicide Prevention Hotlines   · National Suicide Prevention Lifeline 784-095-JIMD (2761)  · National Hope Line Network 800-SUICIDE (171-4564)      Sepsis, Diagnosis, Adult  Sepsis is a serious bodily reaction to an infection. The infection that triggers sepsis may be from a bacteria, virus, or fungus. Sepsis can result from an infection in any part of your body. Infections that commonly lead to sepsis include skin, lung, and urinary tract infections.  Sepsis is a medical emergency that must be treated right away in a hospital. In severe cases, it can lead to septic shock. Septic shock can weaken your heart and cause your blood pressure to drop. This can cause your central nervous system and your body's organs to stop working.  What are the causes?  This condition is caused by a severe reaction to infections from bacteria, viruses, or fungus. The germs that most often lead to sepsis include:  · Escherichia coli (E. coli) bacteria.  · Staphylococcus aureus (staph) bacteria.  · Some types of Streptococcus bacteria.  The most common infections affect these organs:  · The lung (pneumonia).  · The kidneys or bladder (urinary tract infection).  · The skin (cellulitis).  · The bowel, gallbladder, or pancreas.  What increases the risk?  You are more likely to develop this condition if:  · Your body's disease-fighting system (immune system) is weakened.  · You are age 65 or older.  · You are male.  · You had surgery or you have been hospitalized.  · You have these devices inserted into your body:  ? A small, thin tube (catheter).  ? IV line.  ? Breathing tube.  ? Drainage tube.  · You are not getting enough nutrients from food (malnourished).  · You have a long-term (chronic) disease, such as cancer, lung disease, kidney disease, or diabetes.  · You are .  What are the signs or symptoms?  Symptoms of this  condition may include:  · Fever.  · Chills or feeling very cold.  · Confusion or anxiety.  · Fatigue.  · Muscle aches.  · Shortness of breath.  · Nausea and vomiting.  · Urinating much less than usual.  · Fast heart rate (tachycardia).  · Rapid breathing (hyperventilation).  · Changes in skin color. Your skin may look blotchy, pale, or blue.  · Cool, clammy, or sweaty skin.  · Skin rash.  Other symptoms depend on the source of your infection.  How is this diagnosed?  This condition is diagnosed based on:  · Your symptoms.  · Your medical history.  · A physical exam.  Other tests may also be done to find out the cause of the infection and how severe the sepsis is. These tests may include:  · Blood tests.  · Urine tests.  · Swabs from other areas of your body that may have an infection. These samples may be tested (cultured) to find out what type of bacteria is causing the infection.  · Chest X-ray to check for pneumonia. Other imaging tests, such as a CT scan, may also be done.  · Lumbar puncture. This removes a small amount of the fluid that surrounds your brain and spinal cord. The fluid is then examined for infection.  How is this treated?  This condition must be treated in a hospital. Based on the cause of your infection, you may be given an antibiotic, antiviral, or antifungal medicine.  You may also receive:  · Fluids through an IV.  · Oxygen and breathing assistance.  · Medicines to increase your blood pressure.  · Kidney dialysis. This process cleans your blood if your kidneys have failed.  · Surgery to remove infected tissue.  · Blood transfusion if needed.  · Medicine to prevent blood clots.  · Nutrients to correct imbalances in basic body function (metabolism). You may:  ? Receive important salts and minerals (electrolytes) through an IV.  ? Have your blood sugar level adjusted.  Follow these instructions at home:  Medicines    · Take over-the-counter and prescription medicines only as told by your health  care provider.  · If you were prescribed an antibiotic, antiviral, or antifungal medicine, take it as told by your health care provider. Do not stop taking the medicine even if you start to feel better.  General instructions  · If you have a catheter or other indwelling device, ask to have it removed as soon as possible.  · Keep all follow-up visits as told by your health care provider. This is important.  Contact a health care provider if:  · You do not feel like you are getting better or regaining strength.  · You are having trouble coping with your recovery.  · You frequently feel tired.  · You feel worse or do not seem to get better after surgery.  · You think you may have an infection after surgery.  Get help right away if:  · You have any symptoms of sepsis.  · You have difficulty breathing.  · You have a rapid or skipping heartbeat.  · You become confused or disoriented.  · You have a high fever.  · Your skin becomes blotchy, pale, or blue.  · You have an infection that is getting worse or not getting better.  These symptoms may represent a serious problem that is an emergency. Do not wait to see if the symptoms will go away. Get medical help right away. Call your local emergency services (911 in the [place] Do not drive yourself to the hospital.  Summary  · Sepsis is a medical emergency that requires immediate treatment in a hospital.  · This condition is caused by a severe reaction to infections from bacteria, viruses, or fungus.  · Based on the cause of your infection, you may be given an antibiotic, antiviral, or antifungal medicine.  · Treatment may also include IV fluids, breathing assistance, and kidney dialysis.  This information is not intended to replace advice given to you by your health care provider. Make sure you discuss any questions you have with your health care provider.  Document Released: 09/15/2004 Document Revised: 07/26/2019 Document Reviewed: 07/26/2019  Elsevier Patient Education ©  2020 [employer]

## 2022-04-07 NOTE — PROGRESS NOTES
Daily Progress Note:     Date of Service: 4/7/2022  Primary Team: [facility] IM White Team   Attending: JUSTIN Tomas   Senior Resident: Dr. Osei  Intern: Romana Zaldivar M.D. MPH   Contact:  [phone]    ID: Ms. Sudha Chi is a 26 y.o. 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 Patient continues to improve with antibiotic therapy.    Subjective  -No acute events overnight  -afebrile overnight with normal heart rates  -Left eye pain resolved after receiving excedrin  -WBC trended up slightly in am, could be secondary to the Excedrin    -Improved neck range of motion  -Improved neck pain and discomfort  -Patient reports neck pain as 310 at rest and 4/10 with movement  -Cultures with no growth to date    Consultants/Specialty:  ID    Review of Systems:    Review of Systems   Constitutional: Negative for chills, fever and weight loss.   HENT: Negative for congestion, ear pain, nosebleeds and sinus pain.         Denies any swallowing difficulties   Eyes: Negative for blurred vision, double vision, pain (Patient reports left eye pain of 2 days duration patient reports that she has a chronic history of left eye pain usually preceding a migraine.  The eye pain is not associated with movement is more so over strain/discomfort.) and discharge.   Respiratory: Positive for cough. Negative for shortness of breath and wheezing.    Cardiovascular: Negative for chest pain, palpitations and leg swelling.   Gastrointestinal: Positive for diarrhea (reports 2 loose stools). Negative for abdominal pain, constipation, nausea and vomiting.   Genitourinary: Negative for dysuria, hematuria and urgency.   Musculoskeletal: Positive for neck pain. Negative for myalgias.    Neurological: Negative for dizziness, focal weakness, weakness and headaches.       Objective Data:   Physical Exam:   Vitals:   Temp:  [36.1 °C (96.9 °F)-36.4 °C (97.6 °F)] 36.1 °C (97 °F)  Pulse:  [65-77] 65  Resp:  [16-18] 16  BP: ()/(51-73) 114/68  SpO2:  [96 %-98 %] 97 %    Physical Exam  Constitutional:       General: She is not in acute distress.     Appearance: She is not ill-appearing or diaphoretic.   HENT:      Head: Normocephalic and atraumatic.      Ears:      Comments: No visible active draining. Mild posterior auricular tenderness.      Nose: No congestion or rhinorrhea.      Mouth/Throat:      Mouth: Mucous membranes are moist.      Pharynx: Oropharynx is clear. No oropharyngeal exudate or posterior oropharyngeal erythema.   Eyes:      General: No scleral icterus.        Right eye: No discharge.         Left eye: No discharge.      Extraocular Movements: Extraocular movements intact.      Conjunctiva/sclera: Conjunctivae normal.      Pupils: Pupils are equal, round, and reactive to light.   Neck:      Comments: Visibly swollen Left neck. Tenderness with palpation.   ROM limited due to pain and swelling - but much improved, and near baseline.   Cardiovascular:      Rate and Rhythm: Normal rate and regular rhythm.      Pulses: Normal pulses.      Heart sounds: Normal heart sounds. No murmur heard.  Pulmonary:      Effort: Pulmonary effort is normal. No respiratory distress.      Breath sounds: Normal breath sounds.      Comments: Tenderness to palpation over the mediastinum  Chest:      Chest wall: No tenderness.   Abdominal:      General: Abdomen is flat. Bowel sounds are normal. There is no distension.      Palpations: Abdomen is soft.      Tenderness: There is no abdominal tenderness. There is no guarding.      Comments: No hepatosplenomegally   Musculoskeletal:         General: No swelling, deformity or signs of injury. Normal range of motion.      Cervical back: Rigidity and tenderness  present.      Right lower leg: No edema.      Left lower leg: No edema.   Skin:     General: Skin is warm and dry.      Findings: No erythema, lesion or rash.   Neurological:      General: No focal deficit present.      Mental Status: She is alert and oriented to person, place, and time.      Cranial Nerves: No cranial nerve deficit.      Sensory: No sensory deficit.      Motor: No weakness.   Psychiatric:         Mood and Affect: Mood normal.         Behavior: Behavior normal.         Thought Content: Thought content normal.         Judgment: Judgment normal.       Labs:   Recent Labs     04/05/22 0229 04/06/22 0205 04/07/22  0331   WBC 8.4 9.3 11.2*   RBC 4.08* 4.28 4.29   HEMOGLOBIN 12.2 12.7 12.8   HEMATOCRIT 37.9 39.7 38.9   MCV 92.9 92.8 90.7   MCH 29.9 29.7 29.8   RDW 44.7 44.3 43.4   PLATELETCT 209 274 291   MPV 11.6 11.4 11.2   NEUTSPOLYS 63.90 57.90 64.30   LYMPHOCYTES 20.60* 26.10 22.10   MONOCYTES 9.50 10.50 9.50   EOSINOPHILS 4.90 4.40 2.80   BASOPHILS 0.50 0.60 0.60     Recent Labs     04/05/22 0229 04/06/22 0205 04/07/22  0331   SODIUM 137 139 136   POTASSIUM 4.1 4.1 3.8   CHLORIDE 105 105 103   CO2 22 22 22   GLUCOSE 109* 119* 117*   BUN 9 21 13     Recent Labs     04/05/22 0229 04/06/22 0205 04/07/22  0331   ALBUMIN 3.7  --   --    TBILIRUBIN 0.3  --   --    ALKPHOSPHAT 53  --   --    TOTPROTEIN 6.7  --   --    ALTSGPT 12  --   --    ASTSGOT 12  --   --    CREATININE 0.65 0.84 0.70     Imaging:   CT-SOFT TISSUE NECK WITH   Final Result      1.  Redemonstrated is extensive inflammatory fat stranding throughout the left neck as well as extending into the superior mediastinum which is most consistent with infection. Asymmetric enlargement of the left sternocleidomastoid muscle suggesting    myositis.   2.  There is no abscess.   3.  Enlarged left cervical lymph nodes are presumably reactive.   4.  There is focal significant narrowing of the left internal jugular vein in the upper neck at the  C1-C2 level however there is no expansile filling defect to suggest acute thrombosis.      CT-CTA CHEST PULMONARY ARTERY W/ RECONS   Final Result      1.  No evidence of pulmonary embolus.      2.  Trace effusions with left lower lobe atelectasis versus infiltrate.      3.  Left supraclavicular edema.            US-EXTREMITY VENOUS UPPER UNILAT LEFT   Final Result      DX-CHEST-2 VIEWS   Final Result      No acute cardiopulmonary abnormality.      MR-MRA NECK-WITH   Final Result      1.  Findings highly suspicious for occlusion of the peripheral portion of the LEFT internal jugular vein as seen on the outside CT. This could be sterile or related to septic thrombophlebitis.   2.  Inflammatory changes in the LEFT neck with enlargement of adjacent lymph nodes likely infectious with components of cellulitis and myositis, less likely primary peritonitis.   3.  Unremarkable appearance of the internal carotid arteries   4.  If further imaging assessment is pursued, repeat contrast enhanced neck CT is recommended. Ultrasound may also be of value.      CT-FOREIGN FILM CAT SCAN   Final Result      CT-FOREIGN FILM CAT SCAN   Final Result        CT-SOFT TISSUE NECK WITH  4/3/22  1.  Redemonstrated is extensive inflammatory fat stranding throughout the left neck as well as extending into the superior mediastinum which is most consistent with infection. Asymmetric enlargement of the left sternocleidomastoid muscle suggesting   myositis.   2.  There is no abscess.   3.  Enlarged left cervical lymph nodes are presumably reactive.   4.  There is focal significant narrowing of the left internal jugular vein in the upper neck at the C1-C2 level however there is no expansile filling defect to suggest acute thrombosis.    CT-CTA CHEST PULMONARY ARTERY W/ RECONS  4/3/22  1.  No evidence of pulmonary embolus.   2.  Trace effusions with left lower lobe atelectasis versus infiltrate.   3.  Left supraclavicular edema.     Problem  Representation:   Ms. Sudha Chi is a 26 y.o. 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 Patient is on zosyn (started 4/2), with improved fevers, tachycardia, and range of motion in neck. Cultures still pending, no growth to date.    * Lemierre syndrome  Assessment & Plan  History of pharyngitis prior to presenting symptoms concerning for possible Lemierre's. MRA revealed inflammation and extensive lymphadenopathy. Started on IV zosyn 4/2. Outside hospital cultures and inpatient cultures with no growth to date. ENT consulted- signed off, ID continues to follow. Range of motion and neck tenderness improved.  -ID recs appreciated  -Managed with IV zosyn while inpatient, transition to augmentin 875mg BID upon discharge for 2 weeks per ID    -no indication for anticoagulation at this time, will reassess depending on symptoms  -f/u OSH blood cx    Sepsis (HCC)- (present on admission)  Assessment & Plan  This is Sepsis Present on admission  SIRS criteria identified on my evaluation include: Fever, with temperature greater than 101 deg F, Tachycardia, with heart rate greater than 90 BPM and Leukocytosis, with WBC greater than 12,000  Source is likely IJV thrombophlebitis  Sepsis protocol initiated  Fluid resuscitation ordered per protocol  Crystalloid Fluid Administration: s/p 2 L IVF  LA neg  Procal Neg  WBC up trending 16.4 from 14 at admission but down trended on 4/4 to 13.2 and normalized on 4/5.   HD stable though with continued tacycardia  Ear fluid cx 4/2: NGTD  Blood culture from OSH- unknown results  Blood cx 4/3- NGTD.  Fevers and tachycardia resolved 4/4  -prn tylenol for fever  -IV zosyn for Lemierre's, transition to PO antibiotics per ID upon discharge   -[facility]    Migraine  without aura and without status migrainosus, not intractable- (present on admission)  Assessment & Plan  Patient reports her last migraine was 3 months ago.  Patient reports that she is experiencing some left eyestrain starting 4/5/2022 is usually precedes the onset of a migraine.  Patient reports in the past her migraine has been responsive to Excedrin, but sumatriptan led to increased neck stiffness and discomfort. Resolved, continue to monitor.  -Excedrin as needed    Romana Zaldivar M.D. MPH

## 2022-04-07 NOTE — NON-PROVIDER
Daily Progress Note:     Date of Service: 4/7/2022  Primary Team: [employer] IM White Team   Attending: JUSTIN Tomas   Senior Resident: Dr. Osei  Intern: Dr. 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     Overnight, Sudha received Excedrin, which resolved the eye pain. She continues to imrpove clinically, with an elevated WBC count most likely secondary to the Excedrin.     Her chest pain is resolved, and her neck pain is a 3/10 with rest and 4/10 with motion. Her ROM has continued to improve, and she has less pain with neck manipulation.     Consultants/Specialty:  Infectious Disease    Review of Systems:  Review of Systems   Constitutional: Negative for chills, fever and malaise/fatigue.   HENT: Negative.    Eyes: Negative.    Respiratory: Negative.    Cardiovascular: Negative for chest pain, palpitations and orthopnea.   Gastrointestinal: Positive for diarrhea.   Musculoskeletal: Positive for neck pain.   Skin: Negative.    Neurological: Negative for dizziness, tingling, sensory change and headaches.   Psychiatric/Behavioral: Negative.        Objective Data:   Physical Exam:   Vitals:   Temp:  [36.1 °C (96.9 °F)-36.4 °C (97.6 °F)] 36.4 °C (97.6 °F)  Pulse:  [70-77] 70  Resp:  [16-18] 18  BP: ()/(51-79) 96/51  SpO2:  [96 %-98 %] 96 %    Physical Exam  Vitals and nursing note reviewed.   Constitutional:       General: She is not in acute distress.     Appearance: Normal appearance.   HENT:      Head: Normocephalic and atraumatic.      Right Ear: External ear normal.      Left Ear: External ear normal.      Nose: Nose normal.      Mouth/Throat:       Mouth: Mucous membranes are moist.      Pharynx: Oropharynx is clear.   Eyes:      Extraocular Movements: Extraocular movements intact.      Conjunctiva/sclera: Conjunctivae normal.      Pupils: Pupils are equal, round, and reactive to light.   Neck:      Comments: The neck is tender to palpation on the left side, and the swelling has decreased  Cardiovascular:      Rate and Rhythm: Normal rate and regular rhythm.      Heart sounds: Normal heart sounds.   Pulmonary:      Effort: Pulmonary effort is normal.      Breath sounds: Normal breath sounds.   Musculoskeletal:      Cervical back: Rigidity and tenderness present.   Lymphadenopathy:      Cervical: Cervical adenopathy present.      Left cervical: Superficial cervical adenopathy present.   Skin:     General: Skin is warm and dry.   Neurological:      General: No focal deficit present.      Mental Status: She is alert and oriented to person, place, and time.   Psychiatric:         Mood and Affect: Mood normal.         Behavior: Behavior normal.         Thought Content: Thought content normal.         Judgment: Judgment normal.       Labs/Imaging:  Recent Labs     04/05/22 0229 04/06/22 0205 04/07/22 0331   WBC 8.4 9.3 11.2*   RBC 4.08* 4.28 4.29   HEMOGLOBIN 12.2 12.7 12.8   HEMATOCRIT 37.9 39.7 38.9   MCV 92.9 92.8 90.7   MCH 29.9 29.7 29.8   RDW 44.7 44.3 43.4   PLATELETCT 209 274 291   MPV 11.6 11.4 11.2   NEUTSPOLYS 63.90 57.90 64.30   LYMPHOCYTES 20.60* 26.10 22.10   MONOCYTES 9.50 10.50 9.50   EOSINOPHILS 4.90 4.40 2.80   BASOPHILS 0.50 0.60 0.60     Recent Labs     04/05/22 0229 04/06/22 0205 04/07/22  0331   SODIUM 137 139 136   POTASSIUM 4.1 4.1 3.8   CHLORIDE 105 105 103   CO2 22 22 22   GLUCOSE 109* 119* 117*   BUN 9 21 13     Recent Labs     04/05/22 0229 04/06/22 0205 04/07/22  0331   ALBUMIN 3.7  --   --    TBILIRUBIN 0.3  --   --    ALKPHOSPHAT 53  --   --    TOTPROTEIN 6.7  --   --    ALTSGPT 12  --   --    ASTSGOT 12  --   --     CREATININE 0.65 0.84 0.70     .    Imaging     4/5/22 - CT of the chest which came back with left supraclavicular edema, trace effusions with left lower lobe atelectasis versus infiltrate and no evidence of a PE.    4/5/22 - He CT of the neck read extensive inflammatory fat stranding throughout the left neck as well as extending into the superior mediastinum which is most consistent with infection. Asymmetric enlargement of the left sternocleidomastoid muscle suggesting   Myositis. There is focal significant narrowing of the left internal jugular vein in the upper neck at the C1-C2 level however there is no expansile filling defect to suggest acute thrombosis.    Problem Representation:  Tahira is a previously healthy 26 year old female, (HD 5) with a pmhx of migraines tx with Sumaptriptan (none recently), presenting with acute onset of severe left ear and neck pain with 3/4 SIRS criteria, internal jugular vein thrombus on CT, and s/s of Lemierre syndrome.      Sepsis   #SIRS 3/4 with leukocytosis (>12,000), tachycardia (>90BPM), and fever (>101F)  The patient originally presented to [facility] for symptoms, where she was treated with Rocephin after a blood culture was obtained.      · 4/3/22: WBC trending up from 14 to 16.4, pro calcitonin and lactic acid negative, continued tachycardia and fever. Fluid resuscitation ordered, 2L IVF  · 4/4/22: WBC continue to trend down, afebrile overnight (last dose of Tylenol at 1454 4/3/22), and tachycardia resolved over the last 24 hours.  · [facility] Lisa noted that the blood cultures at 48 hours showed no sign's of growth. Her direct line to call back at the 5 day point is [phone]  · 4/5/22: Leukocytosis resolved (8.4), nonfebrile, and no longer tachycardic (72 to 85)\  · [facility] Med Tech Sonia stated that blood cultures currently show no growth (12:42 pm)  · 4/6/22: Leukocytosis trending up, but still normal levels (9.3)  · [facility]  Baylee stated the blood culutures were both negative as of 11:18 on 4/6/22  · 4/7/22: The morning BP's have continued to be low, but MAP remains slighly above the minium value of concern  ·  MAP = DP + 1/3 (SP-DP) ; MAP 66  · [facility] Juan Carlos Eliastz; blod cultures were free of growth at 5 days 12:51pm    · The WBC's increased overnight, after a dose of Excedrin, which has shown to be a correlation in previous studies.    · Dr. Plummer would like the patient to follow up outpatient at 9:00 on Monday 4/11 and begin oral Augment BID for 14 days    Pending blood culture results from [facility], Sudha will complete her IV antibiotics today, and be D/C with oral Augmentin for 2 weeks.     Secondary Adrenal Insufficiency  #Hypotension  #Hyponatremia with normal Potassium levels  #Asymptomatic UTI  The patient consistently experiences low BP in the morning hours between 0400 to 0700, which maintain a MAP >60, and she is usually asymptomatic, with one episode of lightheadedness. Sudha states she has seen a PCP once, but did not follow up outpatient to perform labs. With her history of headaches, low sodium level and recent series of illnesses's (COVID in January, Pharyngitis in March, and Lemierre's in April), as well as foul smelling urine. It could be beneficial to rule in/out adrenal insufficiency, or other processes leading to a compromised immune state.   · Order a morning cortisol level, HA1C, and thyroid function tests  · Urine analysis        Lemierre Syndrome  Tahira had pharyngitis 10 days prior to the onset of symptoms, that lasted for 4 days with no other symptoms, and resolved spontaneously. This along with current sequela, are concerning for Lemierre's (Internal jugular vein thrombus).     · 4/3/22: Pain has extended into the anterior mediastinum. Case reports have shown complications of pulmonary involvement, septic emboli, pulmonary infiltrate, pneumothorax, or necrotizing mediastinitis (Uptodate). Due to the  severity of these complications, a CT of the chest and neck with contrast will be ordered.   · 4/4/22: Imaging demonstrated extending infection into the superior mediastinum, as well as either a left lower lobe atelectases vs infiltrate. Findings are less concerning for an ongoing thrombus. Results from the ear culture demonstrated S. Aureus, and blood is pending.   · 4/5/22: Swelling has reduced, and now a firm mass is easily palpable on the left cervical lymph chain  · 4/6/22: Swelling continues to reduce, and pain is now a 3/10 at rest and 4/10 with movement.       Infectious disease consulted, appreciated their recommendations - Blood cultures here as well as in [facility] are negative. Switch to oral regimen to complete 14 days of antibiotic therapy, and follow up outpatient in two weeks.     Note Author  Patricia Zayas, MS3 at the [facility] [employer]

## 2022-04-07 NOTE — DISCHARGE SUMMARY
Discharge Summary    CHIEF COMPLAINT ON ADMISSION  Chief Complaint   Patient presents with   • Neck Pain   • Ear Pain       Reason for Admission  TF transfer     Admission Date  4/2/2022    CODE STATUS  Full Code    HPI & HOSPITAL COURSE  Ms. Sudha Chi is a 26 y.o. female with a PMHx of a migraine (last migraine 3 weeks ago), otherwise healthy presenting with a complaint of acute onset severe left neck and ear pain after having pharyngitis. Upon presentation, patient had 3/4 SIRS criteria that included: Fever, with temperature greater than 101 deg F, Tachycardia, with heart rate greater than 90 BPM and Leukocytosis, with WBC greater than 12,000. CT of the soft tissue in the neck revealed extensive inflammatory fat stranding throughout the left neck as well as extending into the superior mediastinum which is most consistent with infection. Additionally, there was asymmetric enlargement of the left sternocleidomastoid muscle suggesting myositis.     Signs and symptoms were concerning for Lemierre syndrome with her sepsis source likely IJV thrombophlebitis. Infectious disease was consulted and the patient was started on IV zosyn on 4/2. Patient improved on IV zosyn, WBC normalized on 4/5/22 and her fevers and tachycardia resolved on 4/4/22. Prior to discharge there was improved range of motion (near baseline) and the swelling on the left side of her neck had significantly improved with no erythema or fluctuance. The patient has a follow-up visit with infectious disease ~4 days post discharge. Patient was advised to start with Augmentin on the evening of 4/7/22. Blood cultures from an outside hospital and inpatient cultures with no growth to date after ~5 days. Please see below for how the patient's main inpatient problems were managed.     #Lemierre Syndrome  -IV zosyn while inpatient, transition to augmentin 875mg BID upon discharge for 2 weeks per ID    -Flexiril 1 daily PRN for neck pain  -no indication for  anticoagulation at this time, will reassess depending on symptoms  - inpatient and outside hospital cultures with no growth to date    No notes on file    Therefore, she is discharged in fair and stable condition to home with close outpatient follow-up.    The patient met 2-midnight criteria for an inpatient stay at the time of discharge.    Discharge Date  04/07/2022    FOLLOW UP ITEMS POST DISCHARGE  1. Infectious disease  -regarding post hospitalization follow-up    2. Primary care physician   -post hospitalization follow-up    DISCHARGE DIAGNOSES  Principal Problem:    Lemierre syndrome POA: Unknown      Overview: History of pharyngitis prior to symptoms concerning for possible       Lemierre's      Bl cx drawn at OSH      S/p 1 dose C3 at OSH      Started on IV zosyn 4/2      ENT consulted- signed off      MRA with myositis and extensive lymphadenopathy also with IJV thrombus.      ID following      Now with extension of pain into mediastinal area. Have low clinical       suspicion, however known complication of Lemierre's is pulmonary       involvement either from septic emboli, pulm inifiltrate, penumothorax, or       necrotizing mediastinitis. Given ongoing tacy, febrile, and expansion of       pain will get CT chest w cont and soft tissue CT neck to further evaluate.                Active Problems:    Migraine without aura and without status migrainosus, not intractable POA: Yes      Overview: 09/2021: Sumatriptan, once every 5 weeks.  Reports migraines weekly    Sepsis (HCC) POA: Yes  Resolved Problems:    * No resolved hospital problems. *      FOLLOW UP    Snehal Ott A[facility]P.R.[facility]  [address]  [address] [id number] [phone]    In 1 week  Hospital follow up    Hermilo Plummer M.D.  [address] [facility] [id number] [phone]    On 4/11/2022        MEDICATIONS ON DISCHARGE     Medication List      START taking these medications      Instructions   amoxicillin-clavulanate  875-125 MG Tabs  Commonly known as: AUGMENTIN   Take 1 Tablet by mouth 2 times a day for 14 days.  Dose: 1 Tablet     cyclobenzaprine 10 mg Tabs  Commonly known as: Flexeril   Take 1 Tablet by mouth 1 time a day as needed for Moderate Pain or Muscle Spasms.  Dose: 10 mg            Allergies  No Known Allergies    DIET  Orders Placed This Encounter   Procedures   • Diet Order Diet: Regular     Standing Status:   Standing     Number of Occurrences:   1     Order Specific Question:   Diet:     Answer:   Regular [1]       ACTIVITY  As tolerated.  Weight bearing as tolerated    CONSULTATIONS  Infectious Disease    PROCEDURES  None    LABORATORY  Lab Results   Component Value Date    SODIUM 136 04/07/2022    POTASSIUM 3.8 04/07/2022    CHLORIDE 103 04/07/2022    CO2 22 04/07/2022    GLUCOSE 117 (H) 04/07/2022    BUN 13 04/07/2022    CREATININE 0.70 04/07/2022        Lab Results   Component Value Date    WBC 11.2 (H) 04/07/2022    HEMOGLOBIN 12.8 04/07/2022    HEMATOCRIT 38.9 04/07/2022    PLATELETCT 291 04/07/2022        Total time of the discharge process exceeds 35 minutes.

## 2022-04-11 ENCOUNTER — TELEMEDICINE (OUTPATIENT)
Dept: INTERNAL MEDICINE | Facility: OTHER | Age: 27
End: 2022-04-11
Payer: COMMERCIAL

## 2022-04-11 DIAGNOSIS — I80.8 LEMIERRE SYNDROME: ICD-10-CM

## 2022-04-11 DIAGNOSIS — J02.9 LEMIERRE SYNDROME: ICD-10-CM

## 2022-04-11 PROCEDURE — 99213 OFFICE O/P EST LOW 20 MIN: CPT | Mod: GT | Performed by: INTERNAL MEDICINE

## 2022-04-13 NOTE — PROGRESS NOTES
Established Patient    Tahira presents today with the following:    Due to inclement  weather this video meeting was held. She first consented to the  Video, and gave us her address and contact number.     CC: 26 year old female seen in f/u for hospitalization for possible Lemier's Syndrome    HPI: She presented about 10 days ago to [facility] with a fever, throat and neck pain and swelling along her left upper neck.WBC was 16,000. Concern for Lemieres resulted in her transfer to [facility] where she was seen in ER by ENT. CT scan/ angiogram was not clear if she just had compression of the IJ by soft tissue swelling or actually had an IJ clot. She was placed  On empiric pip/tazobactam which she received for 5 days. During that time [facility] and [facility] blood cultures remained negative, including anaerobic bottles. A CT scan of her chest showed mediastinal  Swelling/adenopathy but no septic emboli in her lungs. She quickly defervervesed, and over the next 5 days the neck pain decreased and the neck pain and swelling signifcaantly reduced, so by time of discharge she just had a hard approx 2 cm lump beneath the angle of her left jaw, She felt much better and was discharged on an additional 2 week course of Augmentin  m875 mg BID. She is now seen via video about 5 days since discharge. She states she feels much better, no problem turning her neck, no pain with eating, no fever, sleeping well, and the lump in her neck is still hard but painless and smallerer. She is happy with how she is doing     Social History     Socioeconomic History   • Marital status: Single   Tobacco Use   • Smoking status: Never Smoker   • Smokeless tobacco: Never Used   Substance and Sexual Activity   • Alcohol use: Yes     Comment: occasionally   • Drug use: Yes     Types: Marijuana, Inhaled     Comment: 3x weekly   • Sexual activity: Yes     Partners: Male     Birth control/protection: None     Comment: Single         Patient Active Problem List    Diagnosis Date Noted   • Sepsis (HCC) 04/02/2022   • Lemierre syndrome 04/02/2022   • Migraine without aura and without status migrainosus, not intractable 09/29/2021   • Pressure sensation in both ears 09/29/2021   • Pain of toe of left foot 09/29/2021       Current Outpatient Medications   Medication Sig Dispense Refill   • amoxicillin-clavulanate (AUGMENTIN) 875-125 MG Tab Take 1 Tablet by mouth 2 times a day for 14 days. 28 Tablet 0   • cyclobenzaprine (FLEXERIL) 10 mg Tab Take 1 Tablet by mouth 1 time a day as needed for Moderate Pain or Muscle Spasms. 3 Tablet 0     No current facility-administered medications for this visit.       ROS: As per HPI. Additional pertinent systems as noted below.  Constitutional: Negative for chills and fever.   HENT: see HPI  Eyes: Negative for discharge and redness.   Respiratory: Negative for cough, hemoptysis, wheezing and stridor.    Gastrointestinal: Negative for abdominal pain, constipation, diarrhea, heartburn, nausea and vomiting.        LMP 03/22/2022 (Approximate)     Physical Exam  Viewed by Zoom   Meeting   Constitutional:  oriented to person, place, and time. No distress.  Smiling, in a good mood   Eyes: Pupils are equal, round, and reactive to light. No scleral icterus.  Neck: Neck supple.barely visible residual swelling below left ear.       Note: I have reviewed all pertinent labs and diagnostic tests associated with this visit with specific comments listed under the assessment and plan below    Assessment and Plan    Resolving severe pharyngeal/neck soft tissue infection. Despite negative  Anaerobic blood cultures, the severity of her illness with findings suggestive of IJ thrombosis, makes this likely a case of Lemierre. .She has done quite well after 5 days of antibiotic effective versus Fusobacterium and oral sandy, and now on oral regimen with similar anti-microbial activity. She is doing quite well, and I will just have her   finish off the rest of the 2 weeks of augmentin prescribed as outpatient. We reviewed this plan, will contact me prn any questions or concerns..     There are no diagnoses linked to this encounter.    Followup: No follow-ups on file.      Signed by: Hermilo Plummer M.D.

## 2022-05-24 ENCOUNTER — OFFICE VISIT (OUTPATIENT)
Dept: MEDICAL GROUP | Facility: MEDICAL CENTER | Age: 27
End: 2022-05-24
Payer: COMMERCIAL

## 2022-05-24 VITALS
OXYGEN SATURATION: 98 % | DIASTOLIC BLOOD PRESSURE: 68 MMHG | SYSTOLIC BLOOD PRESSURE: 122 MMHG | BODY MASS INDEX: 29.48 KG/M2 | TEMPERATURE: 97.6 F | WEIGHT: 166.4 LBS | HEART RATE: 97 BPM | HEIGHT: 63 IN

## 2022-05-24 DIAGNOSIS — A41.9 SEPSIS, DUE TO UNSPECIFIED ORGANISM, UNSPECIFIED WHETHER ACUTE ORGAN DYSFUNCTION PRESENT (HCC): ICD-10-CM

## 2022-05-24 DIAGNOSIS — Z30.019 ENCOUNTER FOR INITIAL PRESCRIPTION OF CONTRACEPTIVES, UNSPECIFIED CONTRACEPTIVE: Primary | ICD-10-CM

## 2022-05-24 DIAGNOSIS — R73.9 BLOOD GLUCOSE ELEVATED: ICD-10-CM

## 2022-05-24 LAB
INT CON NEG: NEGATIVE
INT CON POS: POSITIVE
POC URINE PREGNANCY TEST: NORMAL

## 2022-05-24 PROCEDURE — 99213 OFFICE O/P EST LOW 20 MIN: CPT | Performed by: FAMILY MEDICINE

## 2022-05-24 PROCEDURE — 81025 URINE PREGNANCY TEST: CPT | Performed by: FAMILY MEDICINE

## 2022-05-24 ASSESSMENT — ENCOUNTER SYMPTOMS
COUGH: 0
WEAKNESS: 0
FEVER: 0
ABDOMINAL PAIN: 0
DEPRESSION: 0
WEIGHT LOSS: 0
DIZZINESS: 0
MYALGIAS: 0
PALPITATIONS: 0
SHORTNESS OF BREATH: 0
CHILLS: 0

## 2022-05-24 ASSESSMENT — FIBROSIS 4 INDEX: FIB4 SCORE: 0.3

## 2022-05-24 NOTE — ASSESSMENT & PLAN NOTE
Acute.  Patient completed her IV antibiotic doing much better.  Will monitor for any continuation of her symptoms.  
Discussed patient multiple options of birth control.  Not interested in hormonal options.  Recommended the copper IUD.  Discussed about placing referral to gynecology as this may take some time before she is able to get into see them.  Advised to contact the [facility] to schedule appointment to have a copper IUD placed there.  Phone number provided to the patient.  
New diagnosis for the patient.  Suspect that her elevation to her sugars were secondary to her infection.  A1c taken at that time was normal at 5.0.  Recommended that we recheck her A1c in 3 months to to confirm absence of diabetes.  
Detail Level: Detailed

## 2022-05-24 NOTE — PROGRESS NOTES
Tahira Chi is a pleasant 26 y.o. female here for   Chief Complaint   Patient presents with   • Contraception      HPI:   Problem   Encounter for Initial Prescription of Contraceptives    Would like to be back started on birth control would like to avoid hormonal options.  Had a copper IUD placed, 5 years after its insertion became pregnant.  Is interested in getting back started on the copper IUD.  Hope to get in with a gynecologist sooner due to coming off of her parents insurance and would like to make sure that this is covered.     Blood Glucose Elevated    Recent hospital stay back in April she had labs drawn which showed elevation to her glucose levels.  It was recommended that she follow-up with her PCP for diabetic testing.  On 04/07 had A1c which was 5.0.  Concerned that she does have diabetes in the family     Sepsis ([facility])    Admitted to the hospital at the beginning of April for suspicion of sepsis.  Patient had a 5-day hospital stay 1 which she was on IV antibiotics.  Since being discharged from the hospital she has felt much better though still continues to feel somewhat fatigued.            Current Medicines (including changes today)  Current Outpatient Medications   Medication Sig Dispense Refill   • cyclobenzaprine (FLEXERIL) 10 mg Tab Take 1 Tablet by mouth 1 time a day as needed for Moderate Pain or Muscle Spasms. 3 Tablet 0     No current facility-administered medications for this visit.     Past Medical/ Surgical History  She  has a past medical history of Closed displaced fracture of head of radius with routine healing (3/1/2021), Migraine, and Sprain of ulnar collateral ligament of right elbow (3/1/2021).  She  has a past surgical history that includes other orthopedic surgery.    Review of Systems   Constitutional: Negative for chills, fever, malaise/fatigue and weight loss.   Respiratory: Negative for cough and shortness of breath.    Cardiovascular: Negative for chest pain and  "palpitations.   Gastrointestinal: Negative for abdominal pain.   Genitourinary: Negative.    Musculoskeletal: Negative for myalgias.   Skin: Negative for rash.   Neurological: Negative for dizziness and weakness.   Psychiatric/Behavioral: Negative for depression.         Objective:     /68 (BP Location: Left arm, Patient Position: Sitting, BP Cuff Size: Adult)   Pulse 97   Temp 36.4 °C (97.6 °F) (Temporal)   Ht 1.6 m (5' 3\")   Wt 75.5 kg (166 lb 6.4 oz)   SpO2 98%  Body mass index is 29.48 kg/m².    Physical Exam  Constitutional:       General: She is not in acute distress.  HENT:      Head: Normocephalic and atraumatic.   Eyes:      Conjunctiva/sclera: Conjunctivae normal.      Pupils: Pupils are equal, round, and reactive to light.   Pulmonary:      Effort: Pulmonary effort is normal. No respiratory distress.   Abdominal:      General: There is no distension.   Musculoskeletal:      Cervical back: Normal range of motion and neck supple.   Skin:     General: Skin is warm and dry.      Findings: No rash.   Neurological:      Mental Status: She is alert and oriented to person, place, and time.      Gait: Gait is intact.   Psychiatric:         Mood and Affect: Affect normal.        Imagin2022 CT-soft tissue of the neck:   1.  Redemonstrated is extensive inflammatory fat stranding throughout the left neck as well as extending into the superior mediastinum which is most consistent with infection. Asymmetric enlargement of the left sternocleidomastoid muscle suggesting   myositis.  2.  There is no abscess.  3.  Enlarged left cervical lymph nodes are presumably reactive.  4.  There is focal significant narrowing of the left internal jugular vein in the upper neck at the C1-C2 level however there is no expansile filling defect to suggest acute thrombosis.  2022 CTA: No evidence of pulmonary embolus, trace effusion to the left lower lobe atelectasis versus infiltrate, left supraclavicular " edema    Labs  Recent labs from 04/07/2022 reviewed with the patient.   Assessment and Plan:   The following treatment plan was discussed    Problem List Items Addressed This Visit     Sepsis ([facility])     Acute.  Patient completed her IV antibiotic doing much better.  Will monitor for any continuation of her symptoms.           Encounter for initial prescription of contraceptives - Primary     Discussed patient multiple options of birth control.  Not interested in hormonal options.  Recommended the copper IUD.  Discussed about placing referral to gynecology as this may take some time before she is able to get into see them.  Advised to contact the [facility] department to schedule appointment to have a copper IUD placed there.  Phone number provided to the patient.           Relevant Orders    POCT Pregnancy (Completed)    Blood glucose elevated     New diagnosis for the patient.  Suspect that her elevation to her sugars were secondary to her infection.  A1c taken at that time was normal at 5.0.  Recommended that we recheck her A1c in 3 months to to confirm absence of diabetes.           Relevant Orders    HEMOGLOBIN A1C           Followup: Return in about 6 months (around 11/24/2022) for Follow-up.      Please note that this dictation was created using voice recognition software. I have made every reasonable attempt to correct obvious errors, but I expect that there are errors of grammar and possibly content that I did not discover before finalizing the note.